# Patient Record
Sex: FEMALE | Race: ASIAN | Employment: FULL TIME | URBAN - METROPOLITAN AREA
[De-identification: names, ages, dates, MRNs, and addresses within clinical notes are randomized per-mention and may not be internally consistent; named-entity substitution may affect disease eponyms.]

---

## 2018-08-08 ENCOUNTER — TELEPHONE (OUTPATIENT)
Dept: PEDIATRICS | Facility: CLINIC | Age: 43
End: 2018-08-08

## 2018-08-08 NOTE — TELEPHONE ENCOUNTER
Reason for Call:  Other appointment    Detailed comments: Dr. Miles called to establish care and schedule a physical. Is requesting Dr. 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 Please advise if OK to establish care w/Dr. Thomas and call to advise or reschedule with another provider.      Phone Number Patient can be reached at: Cell number on file:    Telephone Information:   Mobile [phone]       Best Time: upon approval or decline of request    Can we leave a detailed message on this number? YES    Call taken on 8/8/2018 at 2:29 PM by Yanna Lozano

## 2018-08-08 NOTE — TELEPHONE ENCOUNTER
Spoke with patient and informed her of providers note below, pt verbalized understanding.  Appt scheduled with provider 09/17/18.

## 2019-04-23 ENCOUNTER — HOSPITAL ENCOUNTER (OUTPATIENT)
Dept: LAB | Facility: CLINIC | Age: 44
Discharge: HOME | End: 2019-04-23
Attending: INTERNAL MEDICINE | Admitting: INTERNAL MEDICINE
Payer: COMMERCIAL

## 2019-04-23 DIAGNOSIS — R10.9 FLANK PAIN: Primary | ICD-10-CM

## 2019-04-23 LAB
ALBUMIN UR-MCNC: NEGATIVE MG/DL
ANION GAP SERPL CALCULATED.3IONS-SCNC: 5 MMOL/L (ref 3–14)
APPEARANCE UR: CLEAR
BACTERIA #/AREA URNS HPF: ABNORMAL /HPF
BILIRUB UR QL STRIP: NEGATIVE
BUN SERPL-MCNC: 13 MG/DL (ref 7–30)
CALCIUM SERPL-MCNC: 8.9 MG/DL (ref 8.5–10.1)
CHLORIDE SERPL-SCNC: 106 MMOL/L (ref 94–109)
CO2 SERPL-SCNC: 29 MMOL/L (ref 20–32)
COLOR UR AUTO: ABNORMAL
CREAT SERPL-MCNC: 0.82 MG/DL (ref 0.52–1.04)
ERYTHROCYTE [DISTWIDTH] IN BLOOD BY AUTOMATED COUNT: 13.1 % (ref 10–15)
GFR SERPL CREATININE-BSD FRML MDRD: 86 ML/MIN/{1.73_M2}
GLUCOSE SERPL-MCNC: 100 MG/DL (ref 70–99)
GLUCOSE UR STRIP-MCNC: NEGATIVE MG/DL
HCT VFR BLD AUTO: 42.9 % (ref 35–47)
HGB BLD-MCNC: 14 G/DL (ref 11.7–15.7)
HGB UR QL STRIP: NEGATIVE
KETONES UR STRIP-MCNC: NEGATIVE MG/DL
LEUKOCYTE ESTERASE UR QL STRIP: NEGATIVE
MCH RBC QN AUTO: 27.7 PG (ref 26.5–33)
MCHC RBC AUTO-ENTMCNC: 32.6 G/DL (ref 31.5–36.5)
MCV RBC AUTO: 85 FL (ref 78–100)
MUCOUS THREADS #/AREA URNS LPF: PRESENT /LPF
NITRATE UR QL: NEGATIVE
PH UR STRIP: 5.5 PH (ref 5–7)
PLATELET # BLD AUTO: 292 10E9/L (ref 150–450)
POTASSIUM SERPL-SCNC: 3.6 MMOL/L (ref 3.4–5.3)
RBC # BLD AUTO: 5.05 10E12/L (ref 3.8–5.2)
RBC #/AREA URNS AUTO: 1 /HPF (ref 0–2)
SODIUM SERPL-SCNC: 140 MMOL/L (ref 133–144)
SOURCE: ABNORMAL
SP GR UR STRIP: 1.01 (ref 1–1.03)
SQUAMOUS #/AREA URNS AUTO: <1 /HPF (ref 0–1)
UROBILINOGEN UR STRIP-MCNC: NORMAL MG/DL (ref 0–2)
WBC # BLD AUTO: 8.3 10E9/L (ref 4–11)
WBC #/AREA URNS AUTO: 2 /HPF (ref 0–5)

## 2019-04-23 PROCEDURE — 85027 COMPLETE CBC AUTOMATED: CPT | Performed by: INTERNAL MEDICINE

## 2019-04-23 PROCEDURE — 80048 BASIC METABOLIC PNL TOTAL CA: CPT | Performed by: INTERNAL MEDICINE

## 2019-04-23 PROCEDURE — 81001 URINALYSIS AUTO W/SCOPE: CPT | Performed by: INTERNAL MEDICINE

## 2019-04-23 PROCEDURE — 36415 COLL VENOUS BLD VENIPUNCTURE: CPT | Performed by: INTERNAL MEDICINE

## 2020-06-13 ENCOUNTER — RESULTS ONLY (OUTPATIENT)
Dept: LAB | Age: 45
End: 2020-06-13

## 2020-06-13 ENCOUNTER — APPOINTMENT (OUTPATIENT)
Dept: URGENT CARE | Facility: URGENT CARE | Age: 45
End: 2020-06-13
Payer: COMMERCIAL

## 2020-06-14 LAB
SARS-COV-2 RNA SPEC QL NAA+PROBE: ABNORMAL
SPECIMEN SOURCE: ABNORMAL

## 2020-06-19 ENCOUNTER — HOSPITAL ENCOUNTER (EMERGENCY)
Facility: CLINIC | Age: 45
Discharge: HOME | End: 2020-06-19
Attending: EMERGENCY MEDICINE | Admitting: EMERGENCY MEDICINE
Payer: OTHER MISCELLANEOUS

## 2020-06-19 ENCOUNTER — APPOINTMENT (OUTPATIENT)
Dept: GENERAL RADIOLOGY | Facility: CLINIC | Age: 45
End: 2020-06-19
Attending: EMERGENCY MEDICINE
Payer: OTHER MISCELLANEOUS

## 2020-06-19 ENCOUNTER — APPOINTMENT (OUTPATIENT)
Dept: CT IMAGING | Facility: CLINIC | Age: 45
End: 2020-06-19
Attending: EMERGENCY MEDICINE
Payer: OTHER MISCELLANEOUS

## 2020-06-19 VITALS
SYSTOLIC BLOOD PRESSURE: 101 MMHG | DIASTOLIC BLOOD PRESSURE: 66 MMHG | HEART RATE: 74 BPM | OXYGEN SATURATION: 97 % | RESPIRATION RATE: 17 BRPM | TEMPERATURE: 100 F

## 2020-06-19 DIAGNOSIS — R05.9 COUGH: ICD-10-CM

## 2020-06-19 DIAGNOSIS — U07.1 2019 NOVEL CORONAVIRUS DISEASE (COVID-19): ICD-10-CM

## 2020-06-19 LAB
ANION GAP SERPL CALCULATED.3IONS-SCNC: 5 MMOL/L (ref 3–14)
BASOPHILS # BLD AUTO: 0 10E9/L (ref 0–0.2)
BASOPHILS NFR BLD AUTO: 0.3 %
BUN SERPL-MCNC: 12 MG/DL (ref 7–30)
CALCIUM SERPL-MCNC: 8.3 MG/DL (ref 8.5–10.1)
CHLORIDE SERPL-SCNC: 108 MMOL/L (ref 94–109)
CO2 SERPL-SCNC: 25 MMOL/L (ref 20–32)
CREAT SERPL-MCNC: 0.8 MG/DL (ref 0.52–1.04)
D DIMER PPP FEU-MCNC: 0.8 UG/ML FEU (ref 0–0.5)
DIFFERENTIAL METHOD BLD: ABNORMAL
EOSINOPHIL # BLD AUTO: 0 10E9/L (ref 0–0.7)
EOSINOPHIL NFR BLD AUTO: 0 %
ERYTHROCYTE [DISTWIDTH] IN BLOOD BY AUTOMATED COUNT: 13 % (ref 10–15)
GFR SERPL CREATININE-BSD FRML MDRD: 88 ML/MIN/{1.73_M2}
GLUCOSE SERPL-MCNC: 105 MG/DL (ref 70–99)
HCT VFR BLD AUTO: 39.9 % (ref 35–47)
HGB BLD-MCNC: 13.3 G/DL (ref 11.7–15.7)
IMM GRANULOCYTES # BLD: 0 10E9/L (ref 0–0.4)
IMM GRANULOCYTES NFR BLD: 0.3 %
INTERPRETATION ECG - MUSE: NORMAL
LYMPHOCYTES # BLD AUTO: 1.1 10E9/L (ref 0.8–5.3)
LYMPHOCYTES NFR BLD AUTO: 33.4 %
MCH RBC QN AUTO: 27.6 PG (ref 26.5–33)
MCHC RBC AUTO-ENTMCNC: 33.3 G/DL (ref 31.5–36.5)
MCV RBC AUTO: 83 FL (ref 78–100)
MONOCYTES # BLD AUTO: 0.2 10E9/L (ref 0–1.3)
MONOCYTES NFR BLD AUTO: 5.9 %
NEUTROPHILS # BLD AUTO: 1.9 10E9/L (ref 1.6–8.3)
NEUTROPHILS NFR BLD AUTO: 60.1 %
NRBC # BLD AUTO: 0 10*3/UL
NRBC BLD AUTO-RTO: 0 /100
PLATELET # BLD AUTO: 165 10E9/L (ref 150–450)
POTASSIUM SERPL-SCNC: 3.9 MMOL/L (ref 3.4–5.3)
RBC # BLD AUTO: 4.82 10E12/L (ref 3.8–5.2)
SODIUM SERPL-SCNC: 138 MMOL/L (ref 133–144)
WBC # BLD AUTO: 3.2 10E9/L (ref 4–11)

## 2020-06-19 PROCEDURE — 85025 COMPLETE CBC W/AUTO DIFF WBC: CPT | Performed by: EMERGENCY MEDICINE

## 2020-06-19 PROCEDURE — 25000132 ZZH RX MED GY IP 250 OP 250 PS 637: Performed by: EMERGENCY MEDICINE

## 2020-06-19 PROCEDURE — 25800030 ZZH RX IP 258 OP 636: Performed by: EMERGENCY MEDICINE

## 2020-06-19 PROCEDURE — 80048 BASIC METABOLIC PNL TOTAL CA: CPT | Performed by: EMERGENCY MEDICINE

## 2020-06-19 PROCEDURE — 25000125 ZZHC RX 250: Performed by: EMERGENCY MEDICINE

## 2020-06-19 PROCEDURE — 96374 THER/PROPH/DIAG INJ IV PUSH: CPT | Mod: 59

## 2020-06-19 PROCEDURE — 71045 X-RAY EXAM CHEST 1 VIEW: CPT

## 2020-06-19 PROCEDURE — 25000128 H RX IP 250 OP 636: Performed by: EMERGENCY MEDICINE

## 2020-06-19 PROCEDURE — 71275 CT ANGIOGRAPHY CHEST: CPT

## 2020-06-19 PROCEDURE — 99285 EMERGENCY DEPT VISIT HI MDM: CPT | Mod: 25

## 2020-06-19 PROCEDURE — 96361 HYDRATE IV INFUSION ADD-ON: CPT

## 2020-06-19 PROCEDURE — 93005 ELECTROCARDIOGRAM TRACING: CPT

## 2020-06-19 PROCEDURE — 94640 AIRWAY INHALATION TREATMENT: CPT

## 2020-06-19 PROCEDURE — 85379 FIBRIN DEGRADATION QUANT: CPT | Performed by: EMERGENCY MEDICINE

## 2020-06-19 RX ORDER — SODIUM CHLORIDE 9 MG/ML
1000 INJECTION, SOLUTION INTRAVENOUS CONTINUOUS
Status: DISCONTINUED | OUTPATIENT
Start: 2020-06-19 | End: 2020-06-19 | Stop reason: HOSPADM

## 2020-06-19 RX ORDER — ALBUTEROL SULFATE 90 UG/1
6 AEROSOL, METERED RESPIRATORY (INHALATION) ONCE
Status: COMPLETED | OUTPATIENT
Start: 2020-06-19 | End: 2020-06-19

## 2020-06-19 RX ORDER — ONDANSETRON 4 MG/1
4 TABLET, ORALLY DISINTEGRATING ORAL EVERY 8 HOURS PRN
Qty: 10 TABLET | Refills: 0 | Status: SHIPPED | OUTPATIENT
Start: 2020-06-19 | End: 2020-07-20

## 2020-06-19 RX ORDER — IOPAMIDOL 755 MG/ML
61 INJECTION, SOLUTION INTRAVASCULAR ONCE
Status: COMPLETED | OUTPATIENT
Start: 2020-06-19 | End: 2020-06-19

## 2020-06-19 RX ORDER — ONDANSETRON 2 MG/ML
4 INJECTION INTRAMUSCULAR; INTRAVENOUS EVERY 30 MIN PRN
Status: DISCONTINUED | OUTPATIENT
Start: 2020-06-19 | End: 2020-06-19 | Stop reason: HOSPADM

## 2020-06-19 RX ADMIN — IOPAMIDOL 61 ML: 755 INJECTION, SOLUTION INTRAVENOUS at 14:06

## 2020-06-19 RX ADMIN — ALBUTEROL SULFATE 6 PUFF: 90 AEROSOL, METERED RESPIRATORY (INHALATION) at 14:35

## 2020-06-19 RX ADMIN — SODIUM CHLORIDE 1000 ML: 9 INJECTION, SOLUTION INTRAVENOUS at 12:48

## 2020-06-19 RX ADMIN — ONDANSETRON 4 MG: 2 INJECTION INTRAMUSCULAR; INTRAVENOUS at 12:47

## 2020-06-19 RX ADMIN — SODIUM CHLORIDE 87 ML: 9 INJECTION, SOLUTION INTRAVENOUS at 14:07

## 2020-06-19 NOTE — ED PROVIDER NOTES
History     Chief Complaint:  Fever    HPI   Trinity Chow is a 44 year old female who presents to the emergency department for evaluation of fever. The patient reports that she has had a fever for the last 7 days. 6 days ago she had a COVID swab done and was informed 4 days ago that her test was positive. Since then her fevers have been fairly persistent and have ranged up to 101 or 102. Today her cough worsened and became more productive. Her fever also went up to 102 and the patient is concerned that she might develop a pneumonia. She also states that she has had some mild nausea and a poor appetite. The patient has been monitoring her pulse ox at home and states that it was as low as 92 or 93 and that her baseline is around 97. She denies any chest pain, shortness of breath, diarrhea or vomiting. She also denies alcohol, drug or tobacco use or any other medical history.     Allergies:  No Known Drug Allergies    Medications:    The patient is not currently taking any prescribed medications.    Past Medical History:    Hypogonadotropic hypogonadism    Past Surgical History:      Refractive surgery    Family History:    No past pertinent family history.    Social History:  The patient presents alone.  Smoking Status: Never  Smokeless Tobacco: Never  Alcohol Use: No  Drug Use: Not on file   Marital Status:        Review of Systems   All other systems reviewed and are negative.      Physical Exam   Vitals:  Patient Vitals for the past 24 hrs:   BP Temp Temp src Heart Rate Resp SpO2   20 1440 -- -- -- -- -- 99 %   20 1300 -- -- -- -- -- 97 %   20 1230 -- -- -- -- -- 96 %   20 1220 103/64 100  F (37.8  C) Oral 78 17 97 %        Physical Exam  General: Resting on the bed.  Appears comfortable, able to talk in full sentences   Head: No obvious trauma to head.  Ears, Nose, Throat:  External ears normal.  Nose normal.  No pharyngeal erythema, swelling or exudate.  Midline  uvula.    Eyes:  Conjunctivae clear.  Pupils are equal, round, and reactive.   Neck: Normal range of motion.  Neck supple.   CV: Regular rate and rhythm.  No murmurs.      Respiratory: Effort normal and breath sounds normal.  No wheezing.  Faint crackles throughout   Gastrointestinal: Soft.  No distension. There is no tenderness.    Musculoskeletal: Non tender non edematous calves  Neuro: Alert. Moving all extremities appropriately.  Normal speech.    Skin: Skin is warm and dry.  No rash noted.   Emergency Department Course   ECG:  Completed at 1303.  Read at 1305.   Rate 67 bpm. NH interval 148. QRS duration 78. QT/QTc 440/464. P-R-T axes 57 65 65.  Normal sinus rhythm  Possible left atrial enlargement    Imaging:  Radiographic findings were communicated with the patient who voiced understanding of the findings.    XR Chest Port 1 View  Negative chest.  Reading per radiology.    CT Chest Pulmonary Embolism W Contrast  IMPRESSION:   1.  No evidence of pulmonary embolism.   2. Commonly reported imaging features of (COVID-19) pneumonia are   present. Influenza pneumonia and organizing pneumonia as can be seen   in the setting of drug toxicity and connective tissue disease cancer   causing a similar imaging pattern.   Reading per radiology.    Laboratory:  CBC: WBC 3.2 (L) o/w WNL. (HGB 13.3, )   BMP: Glucose 105 (H), Calcium 8.3 (L) o/w  AWNL (Creatinine 0.80)    D Dimer (Collected 1252): 0.8 (H)     Interventions:  1247 Zofran 4 mg IV  1248 NS, 1 L, IV bolus  1435 Albuterol inhaler 6 puff Inhalation    Emergency Department Course:  Past medical records, nursing notes, and vitals reviewed.    1230 I performed an exam of the patient as documented above.     EKG obtained in the ED, see results above.   IV was inserted and blood was drawn for laboratory testing, results above.  The patient was sent for a chest XR and chest CT while in the emergency department, results above.     1449 I rechecked the patient and  discussed the results of her workup thus far. Discussed plan of care and patient will be discharged.    Findings and plan explained to the Patient. Patient discharged home with instructions regarding supportive care, medications, and reasons to return. The importance of close follow-up was reviewed. The patient was prescribed Zofran.    I personally reviewed the laboratory results with the Patient and answered all related questions prior to discharge.    Impression & Plan      Medical Decision Makin-year-old female otherwise healthy presents with fever and cough.  Vital signs are reassuring.  Broad differentials pursued include not limited to pneumonia, pneumonitis, PE, dissection, severe sepsis, septic shock, arrhythmia, dehydration, COVID-19, etc.  Patient already has a prior COVID-19 swab done and positive.  She presents with worsening fever and cough.  CBC shows mild leukopenia but no evidence of anemia.  BMP shows no acute electrolyte, metabolic, renal dysfunction.  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  D-dimer was elevated 0.8.  Patient had no chest pain or shortness of breath.  Chest x-ray shows no evidence acute pneumonia, pneumothorax, or effusion.  EKG shows sinus rhythm, no evidence acute ST-T wave change.  No evidence of arrhythmia.  Dimer was elevated therefore CT PE was obtained.  CT PE shows groundglass opacities commonly reported with COVID-19.  No suggestion of superimposed pneumonia can be seen on CT scan.  With prior COVID-19 swab done and positive per CT seems most consistent with COVID-19 infection.  We reviewed imaging and labs together.  She was given albuterol inhaler with some improvement in work of breathing.  She is not tachypneic, not hypoxic, not having retraction or other acute indications of admission.  She was ambulated in her room without desaturations.  She was given Zofran and fluids with some improvement in her symptoms  as well.  She was prescribed Zofran for home.  At this point she seems reasonably safe for discharge.  She will continue to monitor her symptoms closely.  Close follow-up was advised.  Patient was discharged home.    Covid-19  Trinity Chow was evaluated during a global COVID-19 pandemic, which necessitated consideration that the patient might be at risk for infection with the SARS-CoV-2 virus that causes COVID-19.   Applicable protocols for evaluation were followed during the patient's care.   COVID-19 was considered as part of the patient's evaluation. The plan for testing is:  the patient was referred for outpatient testing.     Diagnosis:    ICD-10-CM    1. 2019 novel coronavirus disease (COVID-19)  U07.1    2. Cough  R05        Disposition:  discharged to home    Discharge Medications:  New Prescriptions    ONDANSETRON (ZOFRAN ODT) 4 MG ODT TAB    Take 1 tablet (4 mg) by mouth every 8 hours as needed for nausea     IRonald, am serving as a scribe on 6/19/2020 at 12:24 PM to personally document services performed by Mary Copeland MD based on my observations and the provider's statements to me.     Scribe Disclosure:  I, Tasha Ortiz, am serving as a scribe at 2:10 PM on 6/19/2020 to document services personally performed by Mary Copeland MD based on my observations and the provider's statements to me.       Ronald Lane  6/19/2020    EMERGENCY DEPARTMENT       Mary Copeland MD  06/19/20 [id number]       Mary Copeland MD  06/19/20 [id number]

## 2020-06-19 NOTE — ED AVS SNAPSHOT
Emergency Department  [address] [facility] [address]  Phone:  [phone]  Fax:  [phone]                                    Trinity Chow   MRN: [id number]    Department:   Emergency Department   Date of Visit:  6/19/2020           After Visit Summary Signature Page    I have received my discharge instructions, and my questions have been answered. I have discussed any challenges I see with this plan with the nurse or doctor.    ..........................................................................................................................................  Patient/Patient Representative Signature      ..........................................................................................................................................  Patient Representative Print Name and Relationship to Patient    ..................................................               ................................................  Date                                   Time    ..........................................................................................................................................  Reviewed by Signature/Title    ...................................................              ..............................................  Date                                               Time          22EPIC Rev 08/18

## 2020-06-19 NOTE — ED TRIAGE NOTES
Pt tested postive for covid on 6/13. Since has has a fever of 101.5 today. Took advil at 11. NS O2 dropped tp 93% on RA.

## 2020-06-19 NOTE — DISCHARGE INSTRUCTIONS
Please return to the ED if you have worsening cough, fevers >101, chest pain, shortness of breath, intractable vomiting, or other acute changes.  Please follow up with your PCP in the next 2-3 days.      Use tylenol and ibuprofen for pain.  Drink plenty of fluids and rest.      Discharge Instructions  COVID-19    Your Provider has determined that you should practice self-isolation and self-monitoring in order to protect yourself and your community from COVID-19, which is the disease caused by a new coronavirus. The virus spreads from person to person primarily by droplets when an infected person coughs or sneezes and the droplet either lands on another person or that other person touches a surface with the droplet on it. Diagnosis of COVID-19 can be made with a test but many times the test is unavailable or not necessary. There is no specific treatment or medicine for the disease.    Symptoms of COVID-19  Many people have no symptoms or mild symptoms.  Symptoms may usually appear 4 to 5 days (up to 14 days) after contact with another ill person. Some people will get severe symptoms and pneumonia. Usual symptoms are:     Fever    Cough   Trouble breathing   Less common symptoms are: Headache, body aches, sore    throat, sneezing, diarrhea.    How to Care for Yourself    Stay home.  Most people will recover from illness with mild symptoms.  Isolation by staying home is the best method to prevent the spread of the illness. Do not go to work or school. Have a friend or relative do your shopping. Do not use public transportation (bus, train) or ridesharing (Lyft, Uber).    How long should I stay home?  If you have symptoms of a respiratory disease (fever, cough), you should stay home for at least 7 days, and for 3 days with no fever and improvement of respiratory symptoms--whichever is longer. (Your fever should be gone for 3 days without using fever-reducing medicine.)    For example, if you have a fever and coughing  for 4 days, you need to stay home 3 more days with no fever for a total of 7 days. Or, if you have a fever and coughing for 5 days, you need to stay home 3 more days with no fever for a total of 8 days.    Separate yourself from other people in your home.?As much as possible, you should stay in one room and away from other people in your home. Also, use a separate bathroom, if possible. Avoid handling pets or other animals while sick.     Wear a facemask if you need to be around other people and cover your mouth and nose with a tissue when you cough or sneeze.     Avoid sharing personal household items. You should not share dishes, drinking glasses, forks/knives/spoons, towels, or bedding with other people in your home. After using these items, they should be washed with soap and water. Clean parts of your home that are touched often (doorknobs, faucets, countertops, etc.) daily.     Wash your hands often with soap and water for at least 20 seconds or use an alcohol-based hand  containing at least 60% alcohol.     Avoid touching your face.    Treat your symptoms: Take Acetaminophen (Tylenol) to treat body aches and fever as needed for comfort. Ibuprofen (Advil or Motrin) can be used as well if you still have symptoms after taking Tylenol.  Drink fluids. Rest.    Watch for worsening symptoms, shortness of breath, or difficulty   Breathing.    Employers/workplaces are being asked by the Centers for Disease Control (CDC) to not request notes/documentation for you to return to work or prove that you were ill. You may choose to show your employer this paperwork.    Return to the Emergency Department if:    If you are developing worsening breathing, shortness of breath, or feel worse you should seek medical attention.  If you are uncertain, contact your health care provider/clinic. If you need emergency medical attention, call 911 and tell them you have been ill.    Discharge Instructions  Upper Respiratory  Infection    The upper respiratory tract includes the sinuses, nasal passages, pharynx, and larynx. A URI, or upper respiratory infection, is an infection of any of the parts of the upper airway. Symptoms include runny nose, congestion, sneezing, sore throat, cough, and fever. URIs are almost always caused by a virus. Antibiotics do not help with viral infections, so are generally not prescribed. A URI is very contagious through coughing and nasal secretions; make sure you wash your hands often and clean surfaces after sneezing, coughing or touching them. While you should start to improve in 3 - 5 days, remember that sometimes a cough can linger for several weeks.    Generally, every Emergency Department visit should have a follow-up clinic visit with either a primary or a specialty clinic/provider. Please follow-up as instructed by your emergency provider today.    Return to the Emergency Department if:  Any of your symptoms get much worse.  You seem very sick, like being too weak to get up.  You have chest pain or shortness of breath.   You have a severe headache.  You are vomiting (throwing up) so much you cannot keep fluids or medicines down.  You have confusion or seem unusually drowsy.  You have a seizure.    What can I do to help myself?  Fill any prescriptions the provider gave you and take them right away  If you have a fever, get plenty of rest and drink lots of fluids, especially water.  Using a humidifier or saline nose spray will also help loosen mucous.   Clothes or blankets will not change your fever. Do what is comfortable for you.  Bathing or sponging in lukewarm water may help you feel better.  Acetaminophen (Tylenol ) or ibuprofen (Advil , Motrin ) will help bring fever down and may help you feel more comfortable. Be sure to read and follow the package directions, and ask your provider if you have questions.  Do not drink alcohol.  Decongestants may help you feel better. You may use decongestant  nose sprays Afrin  (oxymetazoline) or August-Synephrine  (phenylephrine hydrochloride) for up to 3 days, or may use a decongestant tablet like Sudafed  (pseudoephedrine).  If you were given a prescription for medicine here today, be sure to read all of the information (including the package insert) that comes with your prescription.  This will include important information about the medicine, its side effects, and any warnings that you need to know about.  The pharmacist who fills the prescription can provide more information and answer questions you may have about the medicine.  If you have questions or concerns that the pharmacist cannot address, please call or return to the Emergency Department.   Remember that you can always come back to the Emergency Department if you are not able to see your regular provider in the amount of time listed above, if you get any new symptoms, or if there is anything that worries you.

## 2020-06-21 ENCOUNTER — HOSPITAL ENCOUNTER (EMERGENCY)
Facility: CLINIC | Age: 45
Discharge: ANOTHER HOSPITAL | End: 2020-06-21
Attending: EMERGENCY MEDICINE | Admitting: EMERGENCY MEDICINE
Payer: OTHER MISCELLANEOUS

## 2020-06-21 ENCOUNTER — APPOINTMENT (OUTPATIENT)
Dept: GENERAL RADIOLOGY | Facility: CLINIC | Age: 45
End: 2020-06-21
Attending: EMERGENCY MEDICINE
Payer: OTHER MISCELLANEOUS

## 2020-06-21 VITALS
WEIGHT: 155 LBS | TEMPERATURE: 100.5 F | DIASTOLIC BLOOD PRESSURE: 74 MMHG | RESPIRATION RATE: 18 BRPM | SYSTOLIC BLOOD PRESSURE: 110 MMHG | OXYGEN SATURATION: 99 % | HEART RATE: 61 BPM

## 2020-06-21 DIAGNOSIS — R06.03 ACUTE RESPIRATORY DISTRESS: ICD-10-CM

## 2020-06-21 DIAGNOSIS — U07.1 2019 NOVEL CORONAVIRUS DISEASE (COVID-19): ICD-10-CM

## 2020-06-21 DIAGNOSIS — M62.81 GENERALIZED MUSCLE WEAKNESS: ICD-10-CM

## 2020-06-21 LAB
ALBUMIN SERPL-MCNC: 3.1 G/DL (ref 3.4–5)
ALP SERPL-CCNC: 52 U/L (ref 40–150)
ALT SERPL W P-5'-P-CCNC: 28 U/L (ref 0–50)
ANION GAP SERPL CALCULATED.3IONS-SCNC: 6 MMOL/L (ref 3–14)
AST SERPL W P-5'-P-CCNC: 42 U/L (ref 0–45)
B-HCG FREE SERPL-ACNC: <5 IU/L
BASOPHILS # BLD AUTO: 0 10E9/L (ref 0–0.2)
BASOPHILS NFR BLD AUTO: 0.2 %
BILIRUB SERPL-MCNC: 0.3 MG/DL (ref 0.2–1.3)
BUN SERPL-MCNC: 11 MG/DL (ref 7–30)
CALCIUM SERPL-MCNC: 8.5 MG/DL (ref 8.5–10.1)
CHLORIDE SERPL-SCNC: 104 MMOL/L (ref 94–109)
CO2 SERPL-SCNC: 24 MMOL/L (ref 20–32)
CREAT SERPL-MCNC: 0.68 MG/DL (ref 0.52–1.04)
D DIMER PPP FEU-MCNC: 1.4 UG/ML FEU (ref 0–0.5)
DIFFERENTIAL METHOD BLD: NORMAL
EOSINOPHIL # BLD AUTO: 0 10E9/L (ref 0–0.7)
EOSINOPHIL NFR BLD AUTO: 0 %
ERYTHROCYTE [DISTWIDTH] IN BLOOD BY AUTOMATED COUNT: 13.1 % (ref 10–15)
GFR SERPL CREATININE-BSD FRML MDRD: >90 ML/MIN/{1.73_M2}
GLUCOSE SERPL-MCNC: 104 MG/DL (ref 70–99)
HCT VFR BLD AUTO: 38.6 % (ref 35–47)
HGB BLD-MCNC: 13.1 G/DL (ref 11.7–15.7)
IMM GRANULOCYTES # BLD: 0 10E9/L (ref 0–0.4)
IMM GRANULOCYTES NFR BLD: 0.2 %
INTERPRETATION ECG - MUSE: NORMAL
LYMPHOCYTES # BLD AUTO: 1.3 10E9/L (ref 0.8–5.3)
LYMPHOCYTES NFR BLD AUTO: 25.2 %
MCH RBC QN AUTO: 28.1 PG (ref 26.5–33)
MCHC RBC AUTO-ENTMCNC: 33.9 G/DL (ref 31.5–36.5)
MCV RBC AUTO: 83 FL (ref 78–100)
MONOCYTES # BLD AUTO: 0.3 10E9/L (ref 0–1.3)
MONOCYTES NFR BLD AUTO: 4.9 %
NEUTROPHILS # BLD AUTO: 3.6 10E9/L (ref 1.6–8.3)
NEUTROPHILS NFR BLD AUTO: 69.5 %
NRBC # BLD AUTO: 0 10*3/UL
NRBC BLD AUTO-RTO: 0 /100
PLATELET # BLD AUTO: 201 10E9/L (ref 150–450)
POTASSIUM SERPL-SCNC: 3.8 MMOL/L (ref 3.4–5.3)
PROT SERPL-MCNC: 7.6 G/DL (ref 6.8–8.8)
RBC # BLD AUTO: 4.66 10E12/L (ref 3.8–5.2)
SODIUM SERPL-SCNC: 134 MMOL/L (ref 133–144)
WBC # BLD AUTO: 5.1 10E9/L (ref 4–11)

## 2020-06-21 PROCEDURE — 99285 EMERGENCY DEPT VISIT HI MDM: CPT | Mod: 25

## 2020-06-21 PROCEDURE — 25800030 ZZH RX IP 258 OP 636: Performed by: EMERGENCY MEDICINE

## 2020-06-21 PROCEDURE — 85025 COMPLETE CBC W/AUTO DIFF WBC: CPT | Performed by: EMERGENCY MEDICINE

## 2020-06-21 PROCEDURE — 84702 CHORIONIC GONADOTROPIN TEST: CPT

## 2020-06-21 PROCEDURE — 71045 X-RAY EXAM CHEST 1 VIEW: CPT

## 2020-06-21 PROCEDURE — 85379 FIBRIN DEGRADATION QUANT: CPT | Performed by: EMERGENCY MEDICINE

## 2020-06-21 PROCEDURE — 96360 HYDRATION IV INFUSION INIT: CPT

## 2020-06-21 PROCEDURE — 80053 COMPREHEN METABOLIC PANEL: CPT | Performed by: EMERGENCY MEDICINE

## 2020-06-21 PROCEDURE — 25000132 ZZH RX MED GY IP 250 OP 250 PS 637: Performed by: EMERGENCY MEDICINE

## 2020-06-21 PROCEDURE — 93005 ELECTROCARDIOGRAM TRACING: CPT

## 2020-06-21 RX ORDER — ACETAMINOPHEN 500 MG
500 TABLET ORAL ONCE
Status: COMPLETED | OUTPATIENT
Start: 2020-06-21 | End: 2020-06-21

## 2020-06-21 RX ADMIN — ACETAMINOPHEN 500 MG: 500 TABLET, FILM COATED ORAL at 13:04

## 2020-06-21 RX ADMIN — SODIUM CHLORIDE 1000 ML: 9 INJECTION, SOLUTION INTRAVENOUS at 12:55

## 2020-06-21 RX ADMIN — SODIUM CHLORIDE 1000 ML: 9 INJECTION, SOLUTION INTRAVENOUS at 10:30

## 2020-06-21 ASSESSMENT — ENCOUNTER SYMPTOMS
FATIGUE: 1
COUGH: 1
VOMITING: 0
SHORTNESS OF BREATH: 1
DIARRHEA: 0
FEVER: 1
ABDOMINAL PAIN: 0
NAUSEA: 0
MYALGIAS: 0
SORE THROAT: 0

## 2020-06-21 NOTE — ED PROVIDER NOTES
History     Chief Complaint:  Shortness of Breath       HPI   Trinity Chow is a 44 year old female, COVID positive, who presents with shortness of breath. Patient was seen in the emergency department 6/19 with concerns for fever following positive COVID testing from 6/13 (see below). D-dimer was elevated, but CT PE  showed bilateral groundglass opacities, distant with COVID-19 infection, but no PE. Patient improved with albuterol inhaler in the emergency department, as well as Zofran and IVF. Patient was prescribed Zofran for home and discharged.  Since then, she is continued to have intermittent fever, with last temperature measured 2 days ago at 101 Fahrenheit.  She has been following her oxygen saturation with a portable sat probe.  After being discharged from the ED, her sats were 94 to 95%.  Over the last 24 hours, they have dipped as low as 89%.  Along with this, she has had increased shortness of breath, and increased cough.  While in the restroom last night, she became acutely more short of breath, and nearly passed out.  She had tunnel vision, sank down to the floor, landing on her buttocks, but does not think she hit her head.  She has been feeling extremely fatigued.  She denies any vomiting, diarrhea, abdominal pain, myalgias, or sore throat.  She has been trying to stay hydrated.  She has been taking Tylenol as needed for fever.  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    [facility] ED 6/19/2002  RHONDA Copeland MD     ECG:  Completed at 1303.  Read at 1305.   Rate 67 bpm. AK interval 148. QRS duration 78. QT/QTc 440/464. P-R-T axes 57 65 65.  Normal sinus rhythm  Possible left atrial enlargement     Imaging:  Radiographic findings were communicated with the patient who voiced understanding of the findings.     XR Chest Port 1 View  Negative chest.  Reading per radiology.     CT Chest Pulmonary Embolism W Contrast  IMPRESSION:   1.  No evidence of pulmonary embolism.   2. Commonly reported imaging  features of (COVID-19) pneumonia are   present. Influenza pneumonia and organizing pneumonia as can be seen   in the setting of drug toxicity and connective tissue disease cancer   causing a similar imaging pattern.   Reading per radiology.     Laboratory:  CBC: WBC 3.2 (L) o/w WNL. (HGB 13.3, )   BMP: Glucose 105 (H), Calcium 8.3 (L) o/w  AWNL (Creatinine 0.80)     D Dimer (Collected 1252): 0.8 (H)      Allergies:  No Known Drug Allergies     Medications:    The patient is currently on no regular medications.     Past Medical History:    Hypogonadotropic hypogonadism     Past Surgical History:    History reviewed. No pertinent past surgical history.     Family History:    History reviewed. No pertinent family history.      Social History:  The patient was alone.  Works as a hospitalist at [employer]  Smoking Status: Never  Smokeless Tobacco: Never  Alcohol Use: No   Marital Status:        Review of Systems   Constitutional: Positive for fatigue and fever.   HENT: Negative for sore throat.    Eyes: Positive for visual disturbance (tunnel vision 2/2 ).   Respiratory: Positive for cough and shortness of breath.    Gastrointestinal: Negative for abdominal pain, diarrhea, nausea and vomiting.   Musculoskeletal: Negative for myalgias.   Neurological: Negative for syncope.   All other systems reviewed and are negative.      Physical Exam     Patient Vitals for the past 24 hrs:   BP Temp Temp src Heart Rate Resp SpO2   06/21/20 1000 91/71 100.8  F (38.2  C) Oral 80 18 99 %       Physical Exam  General: Lying on gurney, wearing a mask, short of breath  HENT: mucous membranes moist  CV: regular rate, regular rhythm  Resp: Dyspneic when relating history, bilateral crackles throughout.  Slightly tachypneic.  GI: abdomen soft and nontender, no guarding  MSK: no bony tenderness  Extremities: Calves nontender.  Skin: appropriately warm and dry  Neuro: alert, clear speech, oriented  Psych: normal mood and  affect      Emergency Department Course     ECG:  Indication: Shortness of breath   Completed at 0956.  Read at 1000.   Sinus rhythm   Normal ECG   Rate 73 bpm. WA interval 146. QRS duration 84. QT/QTc 410/451. P-R-T axes 45 63 58.    Imaging:  Radiology findings were communicated with the patient and Accepting MD who voiced understanding of the findings.    XR Chest Port 1 View  IMPRESSION: Single portable AP view of the chest was obtained.  Cardiomediastinal silhouette is within normal limits. Postsurgical  changes of bilateral breast implants. Bibasilar, right worse than  left, patchy pulmonary opacities, slightly worsened as compared to  6/19/2020 chest x-ray and consistent with typical imaging findings of  the COVID-19 infection. No significant pleural effusion or  pneumothorax.  Report per radiology     Laboratory:  Laboratory findings were communicated with the patient and Accepting MD who voiced understanding of the findings.    CBC: AWNL. (WBC 5.1, HGB 13.1, )   CMP: Glucose 104 (H), Albumin 3.1 (L) o/w WNL (Creatinine 0.68)     D-Dimer: 1.4 (H)   ISTAT HCG Qualitative Pregnancy POCT: <5.0     Interventions:  NS Bolus 1,000mL IV     Emergency Department Course:  Past medical records, nursing notes, and vitals reviewed.    0930 I performed an exam of the patient as documented above.     IV was inserted and blood was drawn for laboratory testing, results above.  The patient was sent for a CXR while in the emergency department, results above.     1002 During road test, the patient sat was 94%, however she was extremely weak and had difficulty walking because of fatigue and weakness.  Per staff who saw the patient here 3 days ago, she appears much more ill.    I rechecked the patient and discussed the results of her workup thus far.     Findings and plan explained to the Patient. Patient will be transferred to [facility] via EMS. Discussed the case with Dr. Slater, who will admit the patient to  a monitored bed for further monitoring, evaluation, and treatment.    I personally reviewed the laboratory and imaging results with the Patient and answered all related questions prior to transfer.     Impression & Plan     Covid-19  Trinity Chow was evaluated during a global COVID-19 pandemic and was seen in our emergency department which necessitated consideration that the patient might be at risk for infection with the SARS-CoV-2 virus that causes COVID-19.   Applicable protocols for evaluation were followed during the patient's care.   COVID-19 was considered as part of the patient's evaluation. The plan for testing is:  a test was obtained at a previous visit and reviewed & considered today    Medical Decision Making:  Trinity Chow is a 44-year-old female otherwise healthy who presents with increased shortness of breath.  She has history of positive COVID testing recently for similar symptoms.  In the interim, she has become more weak, fatigued, with increased dyspnea.  Vital signs demonstrate normal oxygen saturation however low normal blood pressure and fever.  Chest x-ray shows interval worsening of bilateral infiltrates, consistent with worsening symptoms.  While ambulating in the ED, she was extremely weak and unable to bear her weight very well.  She did not desaturate, however had increased work of breathing and severe shortness of breath.  Labs are notable for interval increase in d-dimer from 0.8-1.4, which is associated with more severe disease.  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    Diagnosis:    ICD-10-CM    1. 2019 novel coronavirus disease (COVID-19)  U07.1    2. Acute respiratory distress  R06.03    3. Generalized muscle weakness  M62.81        Disposition:  Transferred to [facility].    Scribe Disclosure:  Gloria HAHN, am serving as a scribe at 9:28 AM on 6/21/2020 to document services personally  performed by Nerissa Thurston MD based on my observations and the provider's statements to me.   6/21/2020    EMERGENCY DEPARTMENT       Nerissa Thurston MD  06/21/20 1158

## 2020-06-22 ENCOUNTER — DOCUMENTATION ONLY (OUTPATIENT)
Dept: OTHER | Facility: CLINIC | Age: 45
End: 2020-06-22

## 2020-06-29 ENCOUNTER — AMBULATORY - HEALTHEAST (OUTPATIENT)
Dept: CARE COORDINATION | Facility: CLINIC | Age: 45
End: 2020-06-29

## 2020-06-29 ENCOUNTER — COMMUNICATION - HEALTHEAST (OUTPATIENT)
Dept: CARE COORDINATION | Facility: CLINIC | Age: 45
End: 2020-06-29

## 2020-06-29 DIAGNOSIS — U07.1 2019 NOVEL CORONAVIRUS DISEASE (COVID-19): ICD-10-CM

## 2020-07-01 ENCOUNTER — MYC MEDICAL ADVICE (OUTPATIENT)
Dept: FAMILY MEDICINE | Facility: CLINIC | Age: 45
End: 2020-07-01

## 2020-07-01 ENCOUNTER — VIRTUAL VISIT (OUTPATIENT)
Dept: FAMILY MEDICINE | Facility: CLINIC | Age: 45
End: 2020-07-01
Payer: OTHER MISCELLANEOUS

## 2020-07-01 DIAGNOSIS — U07.1 INFECTION DUE TO 2019 NOVEL CORONAVIRUS: Primary | ICD-10-CM

## 2020-07-01 DIAGNOSIS — R05.9 COUGH: ICD-10-CM

## 2020-07-01 DIAGNOSIS — R09.02 HYPOXEMIA: ICD-10-CM

## 2020-07-01 DIAGNOSIS — Z09 HOSPITAL DISCHARGE FOLLOW-UP: ICD-10-CM

## 2020-07-01 DIAGNOSIS — R79.82 ELEVATED C-REACTIVE PROTEIN (CRP): ICD-10-CM

## 2020-07-01 DIAGNOSIS — R06.09 DYSPNEA ON EXERTION: ICD-10-CM

## 2020-07-01 DIAGNOSIS — R79.89 ELEVATED D-DIMER: ICD-10-CM

## 2020-07-01 DIAGNOSIS — Z79.01: ICD-10-CM

## 2020-07-01 PROCEDURE — 99204 OFFICE O/P NEW MOD 45 MIN: CPT | Mod: 95 | Performed by: INTERNAL MEDICINE

## 2020-07-01 NOTE — PROGRESS NOTES
"Trinity Chow is a 44 year old female who is being evaluated via a billable video visit.      The patient has been notified of following:     \"This video visit will be conducted via a call between you and your physician/provider. We have found that certain health care needs can be provided without the need for an in-person physical exam.  This service lets us provide the care you need with a video conversation.  If a prescription is necessary we can send it directly to your pharmacy.  If lab work is needed we can place an order for that and you can then stop by our lab to have the test done at a later time.    Video visits are billed at different rates depending on your insurance coverage.  Please reach out to your insurance provider with any questions.    If during the course of the call the physician/provider feels a video visit is not appropriate, you will not be charged for this service.\"    Patient has given verbal consent for Video visit? Yes  How would you like to obtain your AVS? [place]on  Patient would like the video invitation sent by: DONAVON, if needed: [phone]  Will anyone else be joining your video visit? No  Subjective     Trinity Chow is a 44 year old female who presents today via video visit for the following health issues:    HPI    Hospital Follow-up Visit:    Hospital/Nursing Home/IP Rehab Facility: [facility]  Date of Admission: 6-  Date of Discharge: 6-  Reason(s) for Admission: Positive COVID.  SOB, Weakness      Was your hospitalization related to COVID-19? YES   How are you feeling today? Better  In the past 24 hours have you had shortness of breath when speaking, walking, or climbing stairs? My breathing issues have improved  Do you have a cough? Yes, I have a cough but it's not worse  When is the last time you had a fever greater than 100?  Are you having any other symptoms? Shortness of breath/dyspnea on exertion   Do you have any other stressors " you would like to discuss with your provider? No      Was the patient in the ICU or did the patient experience delirium during hospitalization?  Yes     Problems taking medications regularly:  None  Medication changes since discharge: None  Problems adhering to non-medication therapy:  None    Summary of hospitalization:  [facility] discharge summary reviewed  Diagnostic Tests/Treatments reviewed.  Follow up needed: Yes  Other Healthcare Providers Involved in Patient s Care:         Specialist appointment - Pulmonary specialty  Update since discharge: improved.   Post Discharge Medication Reconciliation: discharge medications reconciled, continue medications without change.  Plan of care communicated with patient          Patient presented for hospital follow-up, on the 16th of this month had high-grade fever with sore throat, she tested positive for COVID 19, she remained febrile for 9 days, initially was not short of breath was feeling winded and was dyspneic but not short of breath.  She had evaluation initially with CT chest that ruled out PE, she had positive pneumonia.  Patient went to the ER initially, she was discharged home initially, she had a syncopal episode 2 days later and went back to the ER, she was not hypoxic but was dyspneic, she was admitted to [facility] for 5 days in the hospital and she had deterioration including hypoxemia and increased interleukin level suggestive of impending lung injury, she received Remdesivir with the placebo or interleukin-6 inhibitor, as well steroids Decadron per protocol for COVID 19 treatment.  She reports she was not truly hypoxic but she was maintained on oxygen, she went back home on Friday, states she is feeling better, her oxygen goes down to 88 to 89% on exertion on room air, she recovers quickly and oxygen picks up to 93% on room air.  She still feels winded when climbing stairs.  Procalcitonin level was negative so it was unlikely pneumonia,  her D-dimers was 1.4 decreased to 0.6 on discharge, the fibrinogen CRP were elevated.  She received Decadron 6 mg IV for 5 days.  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    Video Start Time: 10:57 AM      There is no problem list on file for this patient.    History reviewed. No pertinent surgical history.    Social History     Tobacco Use     Smoking status: Never Smoker     Smokeless tobacco: Never Used   Substance Use Topics     Alcohol use: Not Currently     History reviewed. No pertinent family history.      Current Outpatient Medications   Medication Sig Dispense Refill     apixaban ANTICOAGULANT (ELIQUIS ANTICOAGULANT) 2.5 MG tablet Take 1 tablet (2.5 mg) by mouth 2 times daily 60 tablet 0     No Known Allergies  Recent Labs   Lab Test 06/21/20  1014 06/19/20  1252   ALT 28  --    CR 0.68 0.80   GFRESTIMATED >90 88   GFRESTBLACK >90 >90   POTASSIUM 3.8 3.9      BP Readings from Last 3 Encounters:   06/21/20 110/74   06/19/20 101/66    Wt Readings from Last 3 Encounters:   06/21/20 70.3 kg (155 lb)                    Reviewed and updated as needed this visit by Provider  Tobacco  Allergies  Meds  Problems  Med Hx  Surg Hx  Fam Hx  Soc Hx          Review of Systems   Constitutional, HEENT, cardiovascular, pulmonary, GI, , musculoskeletal, neuro, skin, endocrine and psych systems are negative, except as otherwise noted.      Objective             Physical Exam     GENERAL: Healthy, alert and no distress  EYES: Eyes grossly normal to inspection.  No discharge or erythema, or obvious scleral/conjunctival abnormalities.  RESP: No audible wheeze, cough, or visible cyanosis.  No visible retractions or increased work of breathing.    RESP has occasional cough  MS: No gross musculoskeletal defects noted.  Normal range of motion.  No visible edema.  SKIN: Visible skin clear. No  significant rash, abnormal pigmentation or lesions.  NEURO: Cranial nerves grossly intact.  Mentation and speech appropriate for age.    PSYCH: Mentation appears normal, affect normal/bright, judgement and insight intact, normal speech and appearance well-groomed.      Diagnostic Test Results:  Labs reviewed in Epic        Trinity was seen today for hospital f/u.    Diagnoses and all orders for this visit:    Infection due to 2019 novel coronavirus  -     apixaban ANTICOAGULANT (ELIQUIS ANTICOAGULANT) 2.5 MG tablet; Take 1 tablet (2.5 mg) by mouth 2 times daily    Hospital discharge follow-up    Prophylactic use of oral factor Xa inhibitor for venous thromboembolism  -     apixaban ANTICOAGULANT (ELIQUIS ANTICOAGULANT) 2.5 MG tablet; Take 1 tablet (2.5 mg) by mouth 2 times daily    Hypoxemia    Dyspnea on exertion    Cough    Elevated d-dimer    Elevated C-reactive protein (CRP)      Discussed with patient about VTE prophylaxis, she is low risk, patient states she wants to discuss first with a friend friend of hers who is a physician critical care medicine in [facility].  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  Down the road she should see pulmonary specialist and do pulmonary function test.  Addendum patient states that she has contacted her friend who is a physician in critical care medicine and who advised her to start on low-dose Eliquis.  We sent a prescription for 2.5 mg twice daily.  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  We will treat at least for 4 weeks with low dose Eliquis 2.5 mg twice daily.  She has low risk for bleeding, denies any history of GI bleed or  history of bleeding diathesis.  There was no documentation of prior blood clots during hospitalization.  Patient remained dyspneic, will closely watch her as outpatient    Video-Visit Details    Type of service:  Video Visit    Video End Time:11:26 AM    Originating Location (pt. Location): Home    Distant Location (provider location):  [facility]     Platform used for Video Visit: AmWell    Return in about 2 weeks (around 7/15/2020), or if symptoms worsen or fail to improve.       Carmen Kemp MD

## 2020-07-01 NOTE — TELEPHONE ENCOUNTER
Please see My Chart message below and advise as appropriate.  Patient had a virtual appointment with provider at 1100 today?  KIRSTIN Alberto, RN  [employer] [facility]

## 2020-07-01 NOTE — TELEPHONE ENCOUNTER
Discussed with patient guidelines for VTE prophylaxis for patients with history of COVID infection post hospital discharge.  Will start patient on Eliquis 2.5 mg twice daily will dispense #60.

## 2020-07-02 NOTE — TELEPHONE ENCOUNTER
This provider discussed with patient recommendations by medical therapy management [MTM] (Dr Nadira Fu)  whom I consulted with and MTM advised that patient is low risk per the screening tools for VTE prophylaxis and patient is not candidate for anticoagulation post COVID 19.  Patient stated she would still want to proceed with the low-dose anticoagulation, and she had already started on Eliquis 2.5 mg twice daily dose.

## 2020-07-08 ENCOUNTER — COMMUNICATION - HEALTHEAST (OUTPATIENT)
Dept: FAMILY MEDICINE | Facility: CLINIC | Age: 45
End: 2020-07-08

## 2020-07-15 ENCOUNTER — COMMUNICATION - HEALTHEAST (OUTPATIENT)
Dept: FAMILY MEDICINE | Facility: CLINIC | Age: 45
End: 2020-07-15

## 2020-07-20 ENCOUNTER — VIRTUAL VISIT (OUTPATIENT)
Dept: FAMILY MEDICINE | Facility: CLINIC | Age: 45
End: 2020-07-20
Payer: OTHER MISCELLANEOUS

## 2020-07-20 DIAGNOSIS — R06.09 DOE (DYSPNEA ON EXERTION): ICD-10-CM

## 2020-07-20 DIAGNOSIS — K21.9 GASTROESOPHAGEAL REFLUX DISEASE, ESOPHAGITIS PRESENCE NOT SPECIFIED: Primary | ICD-10-CM

## 2020-07-20 DIAGNOSIS — U07.1 INFECTION DUE TO 2019 NOVEL CORONAVIRUS: ICD-10-CM

## 2020-07-20 PROCEDURE — 99214 OFFICE O/P EST MOD 30 MIN: CPT | Mod: 95 | Performed by: INTERNAL MEDICINE

## 2020-07-20 RX ORDER — ESOMEPRAZOLE MAGNESIUM 40 MG/1
40 CAPSULE, DELAYED RELEASE ORAL
Qty: 30 CAPSULE | Refills: 0 | Status: SHIPPED | OUTPATIENT
Start: 2020-07-20 | End: 2023-01-12

## 2020-07-20 NOTE — PROGRESS NOTES
"Trinity Chow is a 45 year old female who is being evaluated via a billable video visit.      The patient has been notified of following:     \"This video visit will be conducted via a call between you and your physician/provider. We have found that certain health care needs can be provided without the need for an in-person physical exam.  This service lets us provide the care you need with a video conversation.  If a prescription is necessary we can send it directly to your pharmacy.  If lab work is needed we can place an order for that and you can then stop by our lab to have the test done at a later time.    Video visits are billed at different rates depending on your insurance coverage.  Please reach out to your insurance provider with any questions.    If during the course of the call the physician/provider feels a video visit is not appropriate, you will not be charged for this service.\"    Patient has given verbal consent for Video visit? Yes  How would you like to obtain your AVS? [place]  If you are dropped from the video visit, the video invite should be resent to: Text to cell phone: [employer] [phone]  Will anyone else be joining your video visit? No      Subjective     Trinity Chow is a 45 year old female who presents today via video visit for the following health issues:    HPI    Recheck bradycardia, syncope  Patient presenting for follow-up.  She describes she is doing much better, her O2 sat did improve?  Since she started Eliquis.  She wonders whether she had \"micro thrombotic occlusion of vessels\" that improved with Eliquis.  Her shortness of breath is better but is still there.  Her oxygen is greater than 96% on room air even with exertion.  But her heart rate goes up to 120 with minimal exertion.  Before being hospitalized she had a syncopal episode that stated her admission, she describes that during the night she had some bradycardia while in the hospital down to the 40s sleeping.  She " describes also new onset of severe retrosternal?  Tightness/discomfort and feeling the need to burp, she had tried Pepto-Bismol with minimal relief.  She felt better with changing position with sleeping.  She had a similar episode last Friday.  She had not changed her diet.  She does not have any worsening of symptoms with exertion, heart rate usually runs 60 to 70/min or low 100 in general.  Last 2 weeks she felt severe dyspepsia and these atypical chest discomfort symptoms.  Patient describes she did receive Decadron IV during her hospitalization.  She describes her IL-6 was 316 and she has read that people with IL-6 greater than 80 ended up being on the ventilator due to COVID illness.  Patient only needed oxygen supplements during hospital stay.  Patient describes that she did receive the Ribasphere with the IL-6 monoclonal antibody she was enrolled in the study [versus placebo].  Video Start Time: 3:25 PM        There is no problem list on file for this patient.    History reviewed. No pertinent surgical history.    Social History     Tobacco Use     Smoking status: Never Smoker     Smokeless tobacco: Never Used   Substance Use Topics     Alcohol use: Not Currently     No family history on file.      Current Outpatient Medications   Medication Sig Dispense Refill     esomeprazole (NEXIUM) 40 MG DR capsule Take 1 capsule (40 mg) by mouth every morning (before breakfast) Take 30-60 minutes before eating. 30 capsule 0     apixaban ANTICOAGULANT (ELIQUIS ANTICOAGULANT) 2.5 MG tablet Take 1 tablet (2.5 mg) by mouth 2 times daily 60 tablet 0     No Known Allergies  Recent Labs   Lab Test 06/21/20  1014 06/19/20  1252   ALT 28  --    CR 0.68 0.80   GFRESTIMATED >90 88   GFRESTBLACK >90 >90   POTASSIUM 3.8 3.9      BP Readings from Last 3 Encounters:   06/21/20 110/74   06/19/20 101/66    Wt Readings from Last 3 Encounters:   06/21/20 70.3 kg (155 lb)                    Reviewed and updated as needed this visit by  Provider  Tobacco  Allergies  Meds  Med Hx  Surg Hx  Soc Hx        Review of Systems   Constitutional, HEENT, cardiovascular, pulmonary, gi and gu systems are negative, except as otherwise noted.      Objective             Physical Exam     GENERAL: Healthy, alert and no distress  EYES: Eyes grossly normal to inspection.  No discharge or erythema, or obvious scleral/conjunctival abnormalities.  RESP: No audible wheeze, cough, or visible cyanosis.  No visible retractions or increased work of breathing.    SKIN: Visible skin clear. No significant rash, abnormal pigmentation or lesions.  NEURO: Cranial nerves grossly intact.  Mentation and speech appropriate for age.  PSYCH: Mentation appears normal, affect normal/bright, judgement and insight intact, normal speech and appearance well-groomed.      Diagnostic Test Results:  Labs reviewed in Epic        Trinity was seen today for recheck.    Diagnoses and all orders for this visit:    Gastroesophageal reflux disease, esophagitis presence not specified  -     esomeprazole (NEXIUM) 40 MG DR capsule; Take 1 capsule (40 mg) by mouth every morning (before breakfast) Take 30-60 minutes before eating.     (dyspnea on exertion)  -     PULMONARY MEDICINE REFERRAL  -     Echocardiogram Complete; Future  -     XR Chest 2 Views; Future  -     General PFT Lab (Please always keep checked); Future    Infection due to 2019 novel coronavirus  -     PULMONARY MEDICINE REFERRAL  -     XR Chest 2 Views; Future  -     General PFT Lab (Please always keep checked); Future      Advised patient to refer to pulmonary specialty for further recommendation I did recommend the next 2 weeks to do a chest x-ray and a baseline echocardiogram and preferably a pulmonary function test as well.  She was in agreement.  She is concerned if her symptoms are suggestive of pericarditis or cardiac etiology.  Probably her atypical chest symptoms are currently related to GERD, advised to start on PPI  therapy and see how she improves with such treatment..  Patient in agreement with plan.  All questions answered.    Video-Visit Details    Type of service:  Video Visit    Video End Time:3:58 PM    Originating Location (pt. Location): Home    Distant Location (provider location):  [facility]     [facility]    No follow-ups on file.       Carmen Kemp MD

## 2020-07-21 ENCOUNTER — COMMUNICATION - HEALTHEAST (OUTPATIENT)
Dept: FAMILY MEDICINE | Facility: CLINIC | Age: 45
End: 2020-07-21

## 2020-07-30 ENCOUNTER — TELEPHONE (OUTPATIENT)
Dept: CARDIOLOGY | Facility: CLINIC | Age: 45
End: 2020-07-30

## 2020-07-30 NOTE — TELEPHONE ENCOUNTER
PATIENT WELLNESS TELEPHONE SCREENING     Step 1 Screening Questions    In the past 3 weeks, have you been exposed to someone with a suspected or known illness?  COVID-19? No  Chickenpox? No   Measles? No  Pertussis? No    In the past 2 weeks, have you had any of the following symptoms?   Fever/Chills? No   Cough? No   Shortness of breath? Yes:    New loss of taste or smell? No  Sore throat? No  Muscle or body aches? No  Headaches? No  Fatigue? Yes:   Vomiting or diarrhea? No    Step 2 Screening Results (Skip if the patient is negative for symptoms)    If the patient is positive for new or worsening symptoms, contact the ordering provider to determine if the procedure is deemed necessary. Determine if patient can be re-scheduled when the patient is symptom free or has a negative COVID test.     If ordering provider deems the procedure is necessary, notify your manager/supervisor. Provide the patient with the procedural department phone number and inform the patient to call the procedural department upon arrival.  The patient will be registered over the phone.    Step 3 Review Visitor Policy  Patient informed of the updated visitor policy   1 visitor allowed per patient   Visitor must screen negative for COVID symptoms   Visitor must wear a mask    Antonio Jordan

## 2020-07-31 ENCOUNTER — HOSPITAL ENCOUNTER (OUTPATIENT)
Dept: CARDIOLOGY | Facility: CLINIC | Age: 45
Discharge: HOME | End: 2020-07-31
Attending: INTERNAL MEDICINE | Admitting: INTERNAL MEDICINE
Payer: OTHER MISCELLANEOUS

## 2020-07-31 DIAGNOSIS — R06.09 DOE (DYSPNEA ON EXERTION): ICD-10-CM

## 2020-07-31 PROCEDURE — 93308 TTE F-UP OR LMTD: CPT

## 2020-07-31 PROCEDURE — 93308 TTE F-UP OR LMTD: CPT | Mod: 26 | Performed by: INTERNAL MEDICINE

## 2020-07-31 PROCEDURE — 93321 DOPPLER ECHO F-UP/LMTD STD: CPT | Mod: 26 | Performed by: INTERNAL MEDICINE

## 2020-07-31 PROCEDURE — 93325 DOPPLER ECHO COLOR FLOW MAPG: CPT | Mod: 26 | Performed by: INTERNAL MEDICINE

## 2020-08-26 NOTE — TELEPHONE ENCOUNTER
RECORDS RECEIVED FROM: Internal /CE   DATE RECEIVED: 9.8.20    NOTES STATUS DETAILS   OFFICE NOTE from referring provider Internal  Viridiana CAMEJO    OFFICE NOTE from other specialist na    DISCHARGE SUMMARY from hospital na    DISCHARGE REPORT from the ER CE/Internal  HE- 6.21.20   Internal- 6.21.20, 6.19.20    MEDICATION LIST Internal     IMAGING  (NEED IMAGES AND REPORTS)     CT SCAN Internal  6.19.20   CHEST XRAY (CXR) Internal  6.21.20, 6.19.20   TESTS     PULMONARY FUNCTION TESTING (PFT) In process         Action 8.26.20 sv   Action Taken Message sent to CC to call pt and help schedule PFT

## 2020-09-20 ENCOUNTER — PRE VISIT (OUTPATIENT)
Dept: PULMONOLOGY | Facility: CLINIC | Age: 45
End: 2020-09-20

## 2020-10-10 NOTE — TELEPHONE ENCOUNTER
RE: request kindly your medical opinion on this patient- VTE porophylaxis post COVID hospitalization.  Received: 3 months ago  Message Contents   Nadira Fu RPH Sayegh, Kamil Nadim, MD             The IMPROVE VTE can be found here: [email or web address]_score/index.[email or web address]     Padua can be found here: [email or web address]/risk_score/index.[email or web address]     Nadira Fu PharmD, AUBREY   Medication Therapy Management Provider   Pager: [phone]    Previous Messages    ----- Message -----   From: Carmen Kemp MD   Sent: 7/1/2020   9:09 PM CDT   To: Nadira Fu RPH   Subject: RE: request kindly your medical opinion on t*     Thanks Nadira appreciate your follow-up message.  I already sent a prescription for Eliquis low-dose 2.5 twice daily , but I will let patient know about your advice and recommendation and scoring system you used that she is low risk.  Thank you for reaching out to other colleagues as well.  Where can I find these scoring tools [Padua and Improve VTE BREANNE).  Thank you again  Carmen   ----- Message -----   From: Nadira Fu RPH   Sent: 7/1/2020   8:41 PM CDT   To: Carmen Kemp MD   Subject: RE: request kindly your medical opinion on t*     Hi Dr. Kemp,     This is a great question.  I did a little bit of research and also chatted with a few of my colleagues who have been doing some additional presentations on medication use and COVID-19.  We all agreed that this pt likely wouldn't not need anticoagulation post discharge.  There are a few different scoring tools (Padua and Improve VTE BREANNE), and on all of them she appears to come out as low risk, no anticoagulation needed.     Let me know if you have any additional questions.  Thanks!   Nadira Fu PharmD, AUBREY   Medication Therapy Management Provider   Pager: [phone]   ----- Message -----   From: Carmen Kemp MD   Sent: 7/1/2020   3:52 PM CDT   To: Nadira Fu [facility],  Carmen Kemp MD   Subject: request kindly your medical opinion on this *     Demetrio Waddell , I want to take your opinion about this patient, seen today virtual visit she is  that had COVID infection and she is following up with us, she is not on oxygen post hospital discharge, she needed oxygen during her hospitalization at [place] ,she had some dyspnea, her d-dimer was 0.6 on discharge, elevated fibrinogen, has normal platelet count, she was on prophylactic anticoagulation during hospitalization.  She was not discharged on anticoagulation, we discussed starting her back on VTE prophylaxis with a lower dose anticoagulation such as Eliquis 2.5 mg twice daily per the guidelines.  Appreciate your feedback.  *[She said she had consulted with 1 of her colleagues critical care specialist on the [facility] who advised her to be on anticoagulation]  Thank you.  Carmen

## 2020-11-14 ENCOUNTER — HEALTH MAINTENANCE LETTER (OUTPATIENT)
Age: 45
End: 2020-11-14

## 2021-02-06 ENCOUNTER — HEALTH MAINTENANCE LETTER (OUTPATIENT)
Age: 46
End: 2021-02-06

## 2021-06-09 NOTE — TELEPHONE ENCOUNTER
I called Trinity Chow for day 22 follow up visit as part of the Remdesivir trial.  Overall she is doing okay, and is not on home oxygen.  she is feeling alright, still has ongoing issues with shortness of breath when walking more than a couple blocks as well as night sweats. she does have limitations to her physical activities (the shortness of breath when walking more than a couple of blocks). Trinity declined to speak with a study doctor regarding her ongoing issues.     Patient was not seen in person for blood draw and vital sign assessment at [facility] drive up location.     Dolly DOSS

## 2021-06-09 NOTE — PROGRESS NOTES
Clinic Care Coordination Contact    Situation: Patient chart reviewed by care coordinator.    Background: Discharged from [facility] with COVID.  Does not have PCP listed.  CC to call and review discharge instructions and to assist with setting up PCP appointment.      Assessment: Called patient who is feeling well.  No questions about discharge instruction.  She has sent a request for an appointment to [facility] to get an appointment at the [facility]. Offered to assist with setting up the appointment and she is comfortable waiting for contact today.  Writer will do chart review to make sure she gets appointment scheduled by tomorrow.  If not, writer can assist.      Plan/Recommendations: Will ensure patient gets follow up with primary care.      Claudia Castillo [facility]  Clinic Care Coordinator  [phone]      Clinic Care Coordination Contact    Situation: Patient chart reviewed by care coordinator.    Assessment: No appointment was set up. Called patient and she asked CC to set up an appointment for anytime this week. Called scheduling and set up phone appointment for July 1 at 11 AM with Carmen Kemp.  Called patient and left this information on her voice mail.     Plan/Recommendations: no further outreach.     Claudia Castillo [facility] Care Coordinator  [phone]

## 2021-06-09 NOTE — TELEPHONE ENCOUNTER
I called Trinity Chow for day 15 follow up visit as part of the Remdesivir trial.  Overall she is doing well, and is not on home oxygen.  she has had adverse medical events since being discharged which are night sweats and altered taste smell; she does feel that these symptoms are improving. Trinity declined to have a study doctor call and follow-up with her regarding these sympoms. she does have limitations to her physical activities as she does get short of breath when walking more than 1 or 2 blocks.     Patient was not seen in person for blood draw and vital sign assessment at [facility] drive up location.     Dolly DOSS

## 2021-06-09 NOTE — TELEPHONE ENCOUNTER
I called Trinity Chow for day 29 follow up visit as part of the Remdesivir trial.  Overall she is doing well, and is not on home oxygen.  she is feeling well and has had adverse medical events since being discharged. She continues to have night sweats and shortness of breath when walking more than a couple of blocks. She has recently had some atypical chest pains which she believes is from dyspepsia.  she does have limitations to her physical activities in regards to her shortness of breath. She does not wish to speak with any of the study doctors in regards to any of these issues. She doesn't have any questions for the study team, I thanked her for her study participation.    Patient was not seen in person for blood draw and vital sign assessment [facility] drive up location.     Dolly CERRATO.

## 2021-06-16 PROBLEM — J12.82 PNEUMONIA DUE TO COVID-19 VIRUS: Status: ACTIVE | Noted: 2020-06-21

## 2021-06-16 PROBLEM — U07.1 PNEUMONIA DUE TO COVID-19 VIRUS: Status: ACTIVE | Noted: 2020-06-21

## 2021-09-12 ENCOUNTER — HEALTH MAINTENANCE LETTER (OUTPATIENT)
Age: 46
End: 2021-09-12

## 2022-01-02 ENCOUNTER — HEALTH MAINTENANCE LETTER (OUTPATIENT)
Age: 47
End: 2022-01-02

## 2022-02-27 ENCOUNTER — HEALTH MAINTENANCE LETTER (OUTPATIENT)
Age: 47
End: 2022-02-27

## 2022-09-16 ENCOUNTER — TELEPHONE (OUTPATIENT)
Dept: INTERNAL MEDICINE | Facility: CLINIC | Age: 47
End: 2022-09-16

## 2022-09-16 DIAGNOSIS — M25.562 ACUTE PAIN OF BOTH KNEES: Primary | ICD-10-CM

## 2022-09-16 DIAGNOSIS — Z13.6 SCREENING FOR ISCHEMIC HEART DISEASE: ICD-10-CM

## 2022-09-16 DIAGNOSIS — Z11.59 ENCOUNTER FOR HEPATITIS C SCREENING TEST FOR LOW RISK PATIENT: ICD-10-CM

## 2022-09-16 DIAGNOSIS — Z00.00 ROUTINE GENERAL MEDICAL EXAMINATION AT A HEALTH CARE FACILITY: ICD-10-CM

## 2022-09-16 DIAGNOSIS — M25.561 ACUTE PAIN OF BOTH KNEES: Primary | ICD-10-CM

## 2022-09-16 DIAGNOSIS — Z83.3 FAMILY HISTORY OF DIABETES MELLITUS: ICD-10-CM

## 2022-09-16 DIAGNOSIS — M79.10 MUSCLE ACHE: ICD-10-CM

## 2022-09-19 ENCOUNTER — OFFICE VISIT (OUTPATIENT)
Dept: INTERNAL MEDICINE | Facility: CLINIC | Age: 47
End: 2022-09-19
Payer: COMMERCIAL

## 2022-09-19 VITALS
TEMPERATURE: 97.6 F | OXYGEN SATURATION: 99 % | HEIGHT: 66 IN | DIASTOLIC BLOOD PRESSURE: 60 MMHG | RESPIRATION RATE: 14 BRPM | WEIGHT: 161 LBS | HEART RATE: 67 BPM | SYSTOLIC BLOOD PRESSURE: 106 MMHG | BODY MASS INDEX: 25.88 KG/M2

## 2022-09-19 DIAGNOSIS — Z12.11 SPECIAL SCREENING FOR MALIGNANT NEOPLASMS, COLON: ICD-10-CM

## 2022-09-19 DIAGNOSIS — R06.09 EXERTIONAL DYSPNEA: ICD-10-CM

## 2022-09-19 DIAGNOSIS — M25.561 ACUTE PAIN OF BOTH KNEES: ICD-10-CM

## 2022-09-19 DIAGNOSIS — M79.10 MUSCLE ACHE: ICD-10-CM

## 2022-09-19 DIAGNOSIS — Z00.00 ROUTINE GENERAL MEDICAL EXAMINATION AT A HEALTH CARE FACILITY: Primary | ICD-10-CM

## 2022-09-19 DIAGNOSIS — E23.0 HYPOGONADOTROPIC HYPOGONADISM (H): ICD-10-CM

## 2022-09-19 DIAGNOSIS — Z11.59 ENCOUNTER FOR HEPATITIS C SCREENING TEST FOR LOW RISK PATIENT: ICD-10-CM

## 2022-09-19 DIAGNOSIS — Z83.3 FAMILY HISTORY OF DIABETES MELLITUS: ICD-10-CM

## 2022-09-19 DIAGNOSIS — Z13.6 SCREENING FOR ISCHEMIC HEART DISEASE: ICD-10-CM

## 2022-09-19 DIAGNOSIS — M25.562 ACUTE PAIN OF BOTH KNEES: ICD-10-CM

## 2022-09-19 DIAGNOSIS — Z12.31 ENCOUNTER FOR SCREENING MAMMOGRAM FOR BREAST CANCER: ICD-10-CM

## 2022-09-19 LAB
ALBUMIN SERPL-MCNC: 3.7 G/DL (ref 3.4–5)
ALP SERPL-CCNC: 76 U/L (ref 40–150)
ALT SERPL W P-5'-P-CCNC: 26 U/L (ref 0–50)
ANION GAP SERPL CALCULATED.3IONS-SCNC: 8 MMOL/L (ref 3–14)
AST SERPL W P-5'-P-CCNC: 18 U/L (ref 0–45)
B BURGDOR IGG+IGM SER QL: 0.04
BASOPHILS # BLD AUTO: 0.1 10E3/UL (ref 0–0.2)
BASOPHILS NFR BLD AUTO: 1 %
BILIRUB SERPL-MCNC: 0.4 MG/DL (ref 0.2–1.3)
BUN SERPL-MCNC: 15 MG/DL (ref 7–30)
CALCIUM SERPL-MCNC: 9.5 MG/DL (ref 8.5–10.1)
CHLORIDE BLD-SCNC: 105 MMOL/L (ref 94–109)
CHOLEST SERPL-MCNC: 259 MG/DL
CK SERPL-CCNC: 65 U/L (ref 30–225)
CO2 SERPL-SCNC: 25 MMOL/L (ref 20–32)
CREAT SERPL-MCNC: 0.92 MG/DL (ref 0.52–1.04)
CRP SERPL-MCNC: <3 MG/L
DEPRECATED CALCIDIOL+CALCIFEROL SERPL-MC: 28 UG/L (ref 20–75)
EOSINOPHIL # BLD AUTO: 0.1 10E3/UL (ref 0–0.7)
EOSINOPHIL NFR BLD AUTO: 2 %
ERYTHROCYTE [DISTWIDTH] IN BLOOD BY AUTOMATED COUNT: 13.3 % (ref 10–15)
ERYTHROCYTE [SEDIMENTATION RATE] IN BLOOD BY WESTERGREN METHOD: 10 MM/HR (ref 0–20)
FASTING STATUS PATIENT QL REPORTED: YES
GFR SERPL CREATININE-BSD FRML MDRD: 76 ML/MIN/1.73M2
GLUCOSE BLD-MCNC: 103 MG/DL (ref 70–99)
HBA1C MFR BLD: 5.4 % (ref 0–5.6)
HCT VFR BLD AUTO: 41.6 % (ref 35–47)
HCV AB SERPL QL IA: NONREACTIVE
HDLC SERPL-MCNC: 39 MG/DL
HGB BLD-MCNC: 13.2 G/DL (ref 11.7–15.7)
IMM GRANULOCYTES # BLD: 0 10E3/UL
IMM GRANULOCYTES NFR BLD: 0 %
LDLC SERPL CALC-MCNC: 185 MG/DL
LYMPHOCYTES # BLD AUTO: 3.9 10E3/UL (ref 0.8–5.3)
LYMPHOCYTES NFR BLD AUTO: 49 %
MCH RBC QN AUTO: 27.3 PG (ref 26.5–33)
MCHC RBC AUTO-ENTMCNC: 31.7 G/DL (ref 31.5–36.5)
MCV RBC AUTO: 86 FL (ref 78–100)
MONOCYTES # BLD AUTO: 0.4 10E3/UL (ref 0–1.3)
MONOCYTES NFR BLD AUTO: 5 %
NEUTROPHILS # BLD AUTO: 3.4 10E3/UL (ref 1.6–8.3)
NEUTROPHILS NFR BLD AUTO: 43 %
NONHDLC SERPL-MCNC: 220 MG/DL
NRBC # BLD AUTO: 0 10E3/UL
NRBC BLD AUTO-RTO: 0 /100
PLATELET # BLD AUTO: 294 10E3/UL (ref 150–450)
POTASSIUM BLD-SCNC: 3.7 MMOL/L (ref 3.4–5.3)
PROT SERPL-MCNC: 7.4 G/DL (ref 6.8–8.8)
RBC # BLD AUTO: 4.83 10E6/UL (ref 3.8–5.2)
SODIUM SERPL-SCNC: 138 MMOL/L (ref 133–144)
TRIGL SERPL-MCNC: 176 MG/DL
TSH SERPL DL<=0.005 MIU/L-ACNC: 1.29 MU/L (ref 0.4–4)
URATE SERPL-MCNC: 5.8 MG/DL (ref 2.6–6)
WBC # BLD AUTO: 7.9 10E3/UL (ref 4–11)

## 2022-09-19 PROCEDURE — 99000 SPECIMEN HANDLING OFFICE-LAB: CPT | Performed by: INTERNAL MEDICINE

## 2022-09-19 PROCEDURE — 83036 HEMOGLOBIN GLYCOSYLATED A1C: CPT | Performed by: INTERNAL MEDICINE

## 2022-09-19 PROCEDURE — 84550 ASSAY OF BLOOD/URIC ACID: CPT | Performed by: INTERNAL MEDICINE

## 2022-09-19 PROCEDURE — 99396 PREV VISIT EST AGE 40-64: CPT | Performed by: INTERNAL MEDICINE

## 2022-09-19 PROCEDURE — 82306 VITAMIN D 25 HYDROXY: CPT | Performed by: INTERNAL MEDICINE

## 2022-09-19 PROCEDURE — 85652 RBC SED RATE AUTOMATED: CPT | Performed by: INTERNAL MEDICINE

## 2022-09-19 PROCEDURE — 86618 LYME DISEASE ANTIBODY: CPT | Performed by: INTERNAL MEDICINE

## 2022-09-19 PROCEDURE — 86140 C-REACTIVE PROTEIN: CPT | Performed by: INTERNAL MEDICINE

## 2022-09-19 PROCEDURE — 36415 COLL VENOUS BLD VENIPUNCTURE: CPT | Performed by: INTERNAL MEDICINE

## 2022-09-19 PROCEDURE — 86038 ANTINUCLEAR ANTIBODIES: CPT | Performed by: INTERNAL MEDICINE

## 2022-09-19 PROCEDURE — 86200 CCP ANTIBODY: CPT | Performed by: INTERNAL MEDICINE

## 2022-09-19 PROCEDURE — 82550 ASSAY OF CK (CPK): CPT | Performed by: INTERNAL MEDICINE

## 2022-09-19 PROCEDURE — 80050 GENERAL HEALTH PANEL: CPT | Performed by: INTERNAL MEDICINE

## 2022-09-19 PROCEDURE — 86803 HEPATITIS C AB TEST: CPT | Performed by: INTERNAL MEDICINE

## 2022-09-19 PROCEDURE — 86431 RHEUMATOID FACTOR QUANT: CPT | Performed by: INTERNAL MEDICINE

## 2022-09-19 PROCEDURE — 82085 ASSAY OF ALDOLASE: CPT | Mod: 90 | Performed by: INTERNAL MEDICINE

## 2022-09-19 PROCEDURE — 80061 LIPID PANEL: CPT | Performed by: INTERNAL MEDICINE

## 2022-09-19 ASSESSMENT — ENCOUNTER SYMPTOMS
SHORTNESS OF BREATH: 0
HEARTBURN: 0
JOINT SWELLING: 0
EYE PAIN: 0
CONSTIPATION: 0
HEMATURIA: 0
NAUSEA: 0
NERVOUS/ANXIOUS: 0
PARESTHESIAS: 1
COUGH: 0
WEAKNESS: 0
HEMATOCHEZIA: 0
SORE THROAT: 0
FEVER: 0
PALPITATIONS: 0
CHILLS: 0
MYALGIAS: 1
ABDOMINAL PAIN: 0
HEADACHES: 0
DYSURIA: 0
ARTHRALGIAS: 1
DIZZINESS: 0
DIARRHEA: 0
FREQUENCY: 0

## 2022-09-19 NOTE — PROGRESS NOTES
Dr Jama's note    Patient's instructions / PLAN:                                                            ASSESSMENT & PLAN:                                                      (Z00.00) Routin general medical examination at a health care facility  (primary encounter diagnosis)  Comment:   Plan:     (E23.0) Hypogonadotropic hypogonadism (H)  Comment:   Plan: DX Hip/Pelvis/Spine            (R06.00) Exertional dyspnea  Comment: after Covid infection   Plan: General PFT Lab (Please always keep checked),         Pulmonary Function Test            (M25.561,  M25.562) Acute pain of both knees  (M79.10) Muscle ache  Comment: no swelling. After a trip to care for ill mother  Plan: Orthopedic  Referral              (Z12.11) Special screening for malignant neoplasms, colon  Comment:   Plan: Adult GI  Referral - Procedure Only            (Z12.31) Encounter for screening mammogram for breast cancer  Comment:   Plan: MA Screening Digital Bilateral, Adult GI          Referral - Procedure Only,         COLOGUARD(EXACT SCIENCES)               Chief Complaint:                                                        Annual exam  Follow up chronic medical problems      SUBJECTIVE:                                                    History of present illness     We reviewed the chronic medical problems as above.   I reviewed the recent tests results in Epic       ROS:     See below    General: Negative for fever, chills, major weight changes, fatigue  Skin: Negative for rashes, abnormal spots  Eyes: Negative for blurred or double vision  ENT/mouth: Negative for sinuses discomfort, earache, sore throat  Respiratory: Negative for cough, wheezes, chronic lung disease  Cardiovascular: Negative for rest or exertional chest pain, shortness of breath, palpitations, leg edema,   Gastrointestinal: Negative for vomiting, abdominal pain, heartburn, blood in stool, diarrhea, constipation  Genitourinary: Negative  for urinary frequency, blood in urine, history of kidney stones  Female: Negative for abnormal vaginal bleeding, vaginal discharge  Neuro: Negative for headaches, numbness, tingling, weakness in arms or legs, history of seizure, recent syncope  Psychiatry: Negative for depression, anxiety, suicidal thoughts  Endo: Negative for known thyroid disease, diabetes.  Hemato/Lymph: Negative for nodes, easy bleeding, history of DVT, blood transfusion  Musculoskeletal: Negative for joint swelling, back pain. Pos for knee pain      PMHx: - reviewed  Past Medical History:   Diagnosis Date     Hypogonadotropic hypogonadism (H)        PSHx: reviewed  Past Surgical History:   Procedure Laterality Date      SECTION          Soc Hx: No daily alcohol, no smoking  Social History     Socioeconomic History     Marital status:      Spouse name: Not on file     Number of children: Not on file     Years of education: Not on file     Highest education level: Not on file   Occupational History     Not on file   Tobacco Use     Smoking status: Never Smoker     Smokeless tobacco: Never Used   Substance and Sexual Activity     Alcohol use: Not Currently     Drug use: Never     Sexual activity: Yes     Partners: Male   Other Topics Concern     Not on file   Social History Narrative     Not on file     Social Determinants of Health     Financial Resource Strain: Not on file   Food Insecurity: Not on file   Transportation Needs: Not on file   Physical Activity: Not on file   Stress: Not on file   Social Connections: Not on file   Intimate Partner Violence: Not on file   Housing Stability: Not on file        Fam Hx: reviewed  No family history on file.      Screening: reviewed      All: reviewed    Meds: reviewed  Current Outpatient Medications   Medication Sig Dispense Refill     apixaban ANTICOAGULANT (ELIQUIS ANTICOAGULANT) 2.5 MG tablet Take 1 tablet (2.5 mg) by mouth 2 times daily 60 tablet 0     esomeprazole (NEXIUM) 40 MG   "capsule Take 1 capsule (40 mg) by mouth every morning (before breakfast) Take 30-60 minutes before eating. 30 capsule 0       OBJECTIVE:                                                    Physical Exam :  Pulse 67, resp. rate 14, height 1.676 m (5' 6\"), weight 73 kg (161 lb), SpO2 99 %.    Blood pressure 106/60, pulse 67, resp. rate 14, height 1.676 m (5' 6\"), weight 73 kg (161 lb), SpO2 99 %.     NAD, appears comfortable  Skin clear, no rashes  Neck: supple, no JVD,  no thyroidmegaly  Lymph nodes non palpable in the cervical, supraclavicular axillaries,   Chest: clear to auscultation with good respiratory effort  Cardiac: S1S2, RRR, no mgr appreciated  Abdomen: soft, not tender, not distended, audible bowel sound, no hepatosplenomegaly, no palpable masses, no abdominal bruits  Extremities: no cyanosis, clubbing or edema.   Neuro: A, Ox3, no focal signs.  Breast examdeferred, she prefers to schedule it with OBGYN MD     Pelvic exam: deferred, she prefers to schedule it with OBGYN MD Latasha Jama MD  Internal Medicine        SUBJECTIVE:   CC: Trinity is an 47 year old who presents for preventive health visit.         HPI            Today's PHQ-2 Score:   PHQ-2 ( 1999 Pfizer) 9/19/2022   Q1: Little interest or pleasure in doing things 0   Q2: Feeling down, depressed or hopeless 0   PHQ-2 Score 0   PHQ-2 Total Score (12-17 Years)- Positive if 3 or more points; Administer PHQ-A if positive -   Q1: Little interest or pleasure in doing things Not at all   Q2: Feeling down, depressed or hopeless Not at all   PHQ-2 Score 0       Abuse: Current or Past (Physical, Sexual or Emotional) - No  Do you feel safe in your environment? Yes    Have you ever done Advance Care Planning? (For example, a Health Directive, POLST, or a discussion with a medical provider or your loved ones about your wishes):     Social History     Tobacco Use     Smoking status: Never Smoker     Smokeless tobacco: Never Used   Substance Use Topics " "    Alcohol use: Not Currently         Alcohol Use 9/19/2022   Prescreen: >3 drinks/day or >7 drinks/week? No       Reviewed orders with patient.  Reviewed health maintenance and updated orders accordingly - Yes  Labs reviewed in EPIC    Breast Cancer Screening:    FHS-7:   Breast CA Risk Assessment (FHS-7) 9/19/2022   Did any of your first-degree relatives have breast or ovarian cancer? No   Did any of your relatives have bilateral breast cancer? No   Did any man in your family have breast cancer? No   Did any woman in your family have breast and ovarian cancer? Yes   Did any woman in your family have breast cancer before age 50 y? Unknown   Do you have 2 or more relatives with breast and/or ovarian cancer? No   Do you have 2 or more relatives with breast and/or bowel cancer? No         Pertinent mammograms are reviewed under the imaging tab.    History of abnormal Pap smear:      Reviewed and updated as needed this visit by clinical staff                    Reviewed and updated as needed this visit by Provider                       Patient has been advised of split billing requirements and indicates understanding: Yes    COUNSELING:  Reviewed preventive health counseling, as reflected in patient instructions       Regular exercise       Healthy diet/nutrition    Estimated body mass index is 24.59 kg/m  as calculated from the following:    Height as of 6/21/20: 1.702 m (5' 7\").    Weight as of 6/21/20: 71.2 kg (157 lb).        She reports that she has never smoked. She has never used smokeless tobacco.    Answers for HPI/ROS submitted by the patient on 9/19/2022  abdominal pain: No  Blood in stool: No  Blood in urine: No  chest pain: No  chills: No  congestion: Yes  constipation: No  cough: No  diarrhea: No  dizziness: No  ear pain: No  eye pain: No  nervous/anxious: No  fever: No  frequency: No  genital sores: No  headaches: No  hearing loss: No  heartburn: No  arthralgias: Yes  joint swelling: No  peripheral " edema: No  mood changes: No  myalgias: Yes  nausea: No  dysuria: No  palpitations: No  Skin sensation changes: Yes  sore throat: No  urgency: No  rash: No  shortness of breath: No  visual disturbance: No  weakness: No        Counseling Resources:  ATP IV Guidelines  Pooled Cohorts Equation Calculator  Breast Cancer Risk Calculator  BRCA-Related Cancer Risk Assessment: FHS-7 Tool  FRAX Risk Assessment  ICSI Preventive Guidelines  Dietary Guidelines for Americans, 2010  [employer] MyPlate  ASA Prophylaxis  Lung CA Screening    Latasha Bowie MD  [facility]

## 2022-09-20 LAB
ALDOLASE SERPL-CCNC: 2 U/L
ANA SER QL IF: NEGATIVE
CCP AB SER IA-ACNC: 0.9 U/ML
RHEUMATOID FACT SER NEPH-ACNC: 7 IU/ML

## 2022-10-07 NOTE — TELEPHONE ENCOUNTER
DIAGNOSIS: B/L knee pain / Latasha Bowie MD -  referring pt to be seen by Dr. Simeon / [facility] / no imaging   APPOINTMENT DATE: 10.12.22   NOTES STATUS DETAILS   OFFICE NOTE from referring provider Internal 9.19.22 Dr Latasha Bowie, [facility] IM   MEDICATION LIST Internal    LABS     CBC/DIFF Care Everywhere

## 2022-10-11 DIAGNOSIS — M25.561 BILATERAL CHRONIC KNEE PAIN: Primary | ICD-10-CM

## 2022-10-11 DIAGNOSIS — G89.29 BILATERAL CHRONIC KNEE PAIN: Primary | ICD-10-CM

## 2022-10-11 DIAGNOSIS — M25.562 BILATERAL CHRONIC KNEE PAIN: Primary | ICD-10-CM

## 2022-10-12 ENCOUNTER — ANCILLARY PROCEDURE (OUTPATIENT)
Dept: MRI IMAGING | Facility: CLINIC | Age: 47
End: 2022-10-12
Attending: ORTHOPAEDIC SURGERY
Payer: COMMERCIAL

## 2022-10-12 ENCOUNTER — ANCILLARY PROCEDURE (OUTPATIENT)
Dept: GENERAL RADIOLOGY | Facility: CLINIC | Age: 47
End: 2022-10-12
Attending: ORTHOPAEDIC SURGERY
Payer: COMMERCIAL

## 2022-10-12 ENCOUNTER — OFFICE VISIT (OUTPATIENT)
Dept: ORTHOPEDICS | Facility: CLINIC | Age: 47
End: 2022-10-12
Attending: INTERNAL MEDICINE
Payer: COMMERCIAL

## 2022-10-12 ENCOUNTER — PRE VISIT (OUTPATIENT)
Dept: ORTHOPEDICS | Facility: CLINIC | Age: 47
End: 2022-10-12

## 2022-10-12 VITALS — HEIGHT: 66 IN | BODY MASS INDEX: 25.88 KG/M2 | WEIGHT: 161 LBS

## 2022-10-12 DIAGNOSIS — M25.561 ACUTE PAIN OF BOTH KNEES: ICD-10-CM

## 2022-10-12 DIAGNOSIS — G89.29 BILATERAL CHRONIC KNEE PAIN: ICD-10-CM

## 2022-10-12 DIAGNOSIS — M25.562 BILATERAL CHRONIC KNEE PAIN: ICD-10-CM

## 2022-10-12 DIAGNOSIS — S83.241A ACUTE MEDIAL MENISCUS TEAR OF RIGHT KNEE: ICD-10-CM

## 2022-10-12 DIAGNOSIS — M79.10 MUSCLE ACHE: ICD-10-CM

## 2022-10-12 DIAGNOSIS — M25.562 ACUTE PAIN OF BOTH KNEES: ICD-10-CM

## 2022-10-12 DIAGNOSIS — M25.561 BILATERAL CHRONIC KNEE PAIN: ICD-10-CM

## 2022-10-12 DIAGNOSIS — S83.241A ACUTE MEDIAL MENISCUS TEAR OF RIGHT KNEE, INITIAL ENCOUNTER: Primary | ICD-10-CM

## 2022-10-12 PROCEDURE — 99203 OFFICE O/P NEW LOW 30 MIN: CPT | Mod: GC | Performed by: ORTHOPAEDIC SURGERY

## 2022-10-12 PROCEDURE — 73721 MRI JNT OF LWR EXTRE W/O DYE: CPT | Mod: RT | Performed by: RADIOLOGY

## 2022-10-12 PROCEDURE — 73562 X-RAY EXAM OF KNEE 3: CPT | Mod: RT | Performed by: RADIOLOGY

## 2022-10-12 NOTE — NURSING NOTE
"Reason For Visit:   Chief Complaint   Patient presents with     Consult     Bilateral knee       ?  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  Work status?  Full time.  Date of injury: About 5 weeks ago  Type of injury: None.  Date of surgery: NA  Type of surgery: NA.    She doesn't recall an injury to her knees but notes an increase in pain recently with any bending and pain keeping her up at night. She does recall a fall during a hike about 6 weeks ago. Her right knee is worse than left knee. The majority of her pain is on the medial knee. She reports a catching and instability and slight swelling.     SANE Score  Left Knee: 45-50  Right Knee: 20-30    Pain Assessment  Patient Currently in Pain: Yes  0-10 Pain Scale: 10 (at worst)  Primary Pain Location: Knee    Ht 1.676 m (5' 6\")   Wt 73 kg (161 lb)   BMI 25.99 kg/m         No Known Allergies    Current Outpatient Medications   Medication     apixaban ANTICOAGULANT (ELIQUIS ANTICOAGULANT) 2.5 MG tablet     esomeprazole (NEXIUM) 40 MG DR capsule     No current facility-administered medications for this visit.         Ora Knight, ATC    "

## 2022-10-12 NOTE — PROGRESS NOTES
CC: Right greater than left knee pain    HPI: Patient is a 47-year-old female who presents us today with right greater than left knee pain.  Does not recall any specific injury or traumatic cause.  She said she began noticing this pain in late August.  She thinks it correlates with a hiking trip.  She is now having pain in the anterior medial aspect of her knee.  She also noticed some pain and clicking on the medial aspect of her knee.  Is worse with ascending and descending stairs, sitting on the floor, and at night.  She is tried p.o. NSAIDs and topicals which have helped but not eliminate her pain.  She has not undergone physical therapy or any intra-articular injection for this.  She denies any previous injury or surgery to either leg.    PMH: None    PSH:     All: none    Meds: None    SH: She is a hospitalist physician at a local hospital.  She does not smoke.    Act: She enjoys walking and hiking    ROS: A complete review of systems was performed and was otherwise negative for General, HEENT, CV, pulm, GI, , neuro, endocrine, heme, lymph, musculoskeletal, or psych.    O:  PE:  RLE: Free and painless range of motion of the hip.  Mild joint effusion.  0 to 130 degrees of passive motion.  Pain at the extremes of motion.  20 degrees shy of full extension due to pain.  Positive patellar grind for pain.  Mild pain to palpation over the medial joint line.  No specific pain to palpation over the lateral joint line.  Ia Lachman.  Stable anterior posterior drawer.  Stable to varus and valgus stress at 0 and 30 degrees of knee flexion.  5/5 knee flexion extension.    Imaging: Three-view x-ray of bilateral knees shows no fracture or acute bony pathology.  Well-maintained joint space in the medial lateral compartment in both knees.  No osteophyte formation noted on AP view.  Patellar sunrise view there is noted to be decreased joint space particular over the lateral facet.  Right is greater then left.   Osteophyte formation over the lateral aspect of the patella.     A/P: Patient is a 47-year-old female presenting with right greater than left anterior medial medial sided knee pain.  On x-ray there is moderate patellofemoral osteoarthritis.  Also she is also having some medial joint line pain and mechanical symptoms on the medial aspect of the knee.  We had a long discussion with her today about the prognosis of moderate arthritis with possible medial meniscus tear as well as the operative and nonoperative treatment options.  We discussed with her corticosteroid injection as well as the possibility of arthroscopy with partial meniscectomy and chondroplasty.  We are going to move forward with getting MRI of the right knee.  She would like to hold off on any intra-articular injection until the MRI is performed.  We are all in agreement with this plan.  We had the opportunity answer questions at this time.  We will order an MRI without contrast of the right knee and will call her for a telehealth visit to go over the results and make a definitive treatment plan moving forward once that MRI has been performed.    Hebert Lozano MD

## 2022-10-12 NOTE — LETTER
10/12/2022         RE: Trinity Chow  [address]  [facility] [address]        Dear Colleague,    Thank you for referring your patient, Trinity Chow, to the [facility] [place]. Please see a copy of my visit note below.    Patient seen and examined with the fellow. I also personally reviewed the images and interpreted the imaging myself.     Assesment: right knee, differential diagnosis includes patellofemoral arthritis vs. Medial meniscus tear    Plan: MRI of knee, f/u after mri    I agree with history, physical and imaging as well as the assessment and plan as detailed by Dr. Lozano.       CC: Right greater than left knee pain    HPI: Patient is a 47-year-old female who presents us today with right greater than left knee pain.  Does not recall any specific injury or traumatic cause.  She said she began noticing this pain in late August.  She thinks it correlates with a hiking trip.  She is now having pain in the anterior medial aspect of her knee.  She also noticed some pain and clicking on the medial aspect of her knee.  Is worse with ascending and descending stairs, sitting on the floor, and at night.  She is tried p.o. NSAIDs and topicals which have helped but not eliminate her pain.  She has not undergone physical therapy or any intra-articular injection for this.  She denies any previous injury or surgery to either leg.    PMH: None    PSH:     All: none    Meds: None    SH: She is a hospitalist physician at a local hospital.  She does not smoke.    Act: She enjoys walking and hiking    ROS: A complete review of systems was performed and was otherwise negative for General, HEENT, CV, pulm, GI, , neuro, endocrine, heme, lymph, musculoskeletal, or psych.    O:  PE:  RLE: Free and painless range of motion of the hip.  Mild joint effusion.  0 to 130 degrees of passive motion.  Pain at the extremes of motion.  20 degrees shy of full extension  due to pain.  Positive patellar grind for pain.  Mild pain to palpation over the medial joint line.  No specific pain to palpation over the lateral joint line.  Ia Lachman.  Stable anterior posterior drawer.  Stable to varus and valgus stress at 0 and 30 degrees of knee flexion.  5/5 knee flexion extension.    Imaging: Three-view x-ray of bilateral knees shows no fracture or acute bony pathology.  Well-maintained joint space in the medial lateral compartment in both knees.  No osteophyte formation noted on AP view.  Patellar sunrise view there is noted to be decreased joint space particular over the lateral facet.  Right is greater then left.  Osteophyte formation over the lateral aspect of the patella.     A/P: Patient is a 47-year-old female presenting with right greater than left anterior medial medial sided knee pain.  On x-ray there is moderate patellofemoral osteoarthritis.  Also she is also having some medial joint line pain and mechanical symptoms on the medial aspect of the knee.  We had a long discussion with her today about the prognosis of moderate arthritis with possible medial meniscus tear as well as the operative and nonoperative treatment options.  We discussed with her corticosteroid injection as well as the possibility of arthroscopy with partial meniscectomy and chondroplasty.  We are going to move forward with getting MRI of the right knee.  She would like to hold off on any intra-articular injection until the MRI is performed.  We are all in agreement with this plan.  We had the opportunity answer questions at this time.  We will order an MRI without contrast of the right knee and will call her for a telehealth visit to go over the results and make a definitive treatment plan moving forward once that MRI has been performed.    MD Solomon Pedraza MD

## 2022-10-12 NOTE — PROGRESS NOTES
Patient seen and examined with the fellow. I also personally reviewed the images and interpreted the imaging myself.     Assesment: right knee, differential diagnosis includes patellofemoral arthritis vs. Medial meniscus tear    Plan: MRI of knee, f/u after mri    I agree with history, physical and imaging as well as the assessment and plan as detailed by Dr. Lozano.

## 2022-10-17 ENCOUNTER — VIRTUAL VISIT (OUTPATIENT)
Dept: ORTHOPEDICS | Facility: CLINIC | Age: 47
End: 2022-10-17
Payer: COMMERCIAL

## 2022-10-17 ENCOUNTER — HOSPITAL ENCOUNTER (OUTPATIENT)
Dept: RESPIRATORY THERAPY | Facility: CLINIC | Age: 47
Discharge: HOME | End: 2022-10-17
Attending: INTERNAL MEDICINE | Admitting: INTERNAL MEDICINE
Payer: COMMERCIAL

## 2022-10-17 DIAGNOSIS — G89.29 CHRONIC PAIN OF RIGHT KNEE: Primary | ICD-10-CM

## 2022-10-17 DIAGNOSIS — R06.09 EXERTIONAL DYSPNEA: ICD-10-CM

## 2022-10-17 DIAGNOSIS — M25.561 CHRONIC PAIN OF RIGHT KNEE: Primary | ICD-10-CM

## 2022-10-17 PROCEDURE — 94729 DIFFUSING CAPACITY: CPT

## 2022-10-17 PROCEDURE — 999N000157 HC STATISTIC RCP TIME EA 10 MIN

## 2022-10-17 PROCEDURE — 99213 OFFICE O/P EST LOW 20 MIN: CPT | Mod: 95 | Performed by: ORTHOPAEDIC SURGERY

## 2022-10-17 PROCEDURE — 94010 BREATHING CAPACITY TEST: CPT

## 2022-10-17 PROCEDURE — 94726 PLETHYSMOGRAPHY LUNG VOLUMES: CPT

## 2022-10-17 NOTE — PROGRESS NOTES
Trinity is a 47 year old who is being evaluated via a billable telephone visit.      Need to review the MRI results with the patient over the telephone today for her right knee MRI.  She denies any intercurrent trauma.    No examination was completed today as this was a telephone visit    MRI does show high-grade cartilage loss of the patellofemoral compartment.  This is particularly true on the patella.The trochlear side is a little bit better well-preserved with the exception of the most proximal aspect of the trochlea.  There is grade 4 change the lateral patella facet extending up onto the central ridge.  Significant effusion is present.  Overall on my review I think the tibiofemoral compartments are largely well-preserved.  No definite tears of the meniscus on my review.  Cruciate and collateral ligaments are intact.      Clinical assessment: Patellar base chondrosis/arthritis with large symptomatic effusion    Plan: Long discussion with the patient.  I reviewed with her her diagnosis.  I told her that it would be my recommendation that we consider a corticosteroid injection.  I think this could help with her large symptomatic effusion and improve the symptoms of her patellar arthritis.    We can arrange a clinic visit when she could come in and have this done.    She does have some pain about her left knee as well as her elbow and she is previously been evaluated for a potential rheumatologic cause a quick scan of these lab values by myself has not demonstrated any evidence of a rheumatologic cause and I think we should just treat her for osteoarthritis at this time.  She was also checked for Lyme's disease so I do not think this is it either.    If she fails to see lasting improvement I think we ultimately could consider arthroscopic evaluation however would like to exhaust nonsurgical things first I think trying a corticosteroid injection is a reasonable way to proceed.        What phone number would you like  to be contacted at? [phone]  How would you like to obtain your AVS? [employer]  Phone call duration: 10 minutes

## 2022-10-17 NOTE — LETTER
10/17/2022         RE: Trinity Chow  [address]  [facility] [address]        Dear Colleague,    Thank you for referring your patient, Trinity Chow, to the [facility] [place]. Please see a copy of my visit note below.    Trinity is a 47 year old who is being evaluated via a billable telephone visit.      Need to review the MRI results with the patient over the telephone today for her right knee MRI.  She denies any intercurrent trauma.    No examination was completed today as this was a telephone visit    MRI does show high-grade cartilage loss of the patellofemoral compartment.  This is particularly true on the patella.The trochlear side is a little bit better well-preserved with the exception of the most proximal aspect of the trochlea.  There is grade 4 change the lateral patella facet extending up onto the central ridge.  Significant effusion is present.  Overall on my review I think the tibiofemoral compartments are largely well-preserved.  No definite tears of the meniscus on my review.  Cruciate and collateral ligaments are intact.      Clinical assessment: Patellar base chondrosis/arthritis with large symptomatic effusion    Plan: Long discussion with the patient.  I reviewed with her her diagnosis.  I told her that it would be my recommendation that we consider a corticosteroid injection.  I think this could help with her large symptomatic effusion and improve the symptoms of her patellar arthritis.    We can arrange a clinic visit when she could come in and have this done.    She does have some pain about her left knee as well as her elbow and she is previously been evaluated for a potential rheumatologic cause a quick scan of these lab values by myself has not demonstrated any evidence of a rheumatologic cause and I think we should just treat her for osteoarthritis at this time.  She was also checked for Lyme's disease so I do not think this is  it either.    If she fails to see lasting improvement I think we ultimately could consider arthroscopic evaluation however would like to exhaust nonsurgical things first I think trying a corticosteroid injection is a reasonable way to proceed.        What phone number would you like to be contacted at? [phone]  How would you like to obtain your AVS? Katherine  Phone call duration: 10 minutes    Solomon Simeon MD

## 2022-10-17 NOTE — PROGRESS NOTES
PFT Note:        Pt completed pulmonary function testing with DLCO.  Good Pt effort and cooperation.  All testing meets ATS recommendations.  DLCO is an average of 2 maneuvers.  Predicted DLCO is corrected for a Hgb of 13.2 drawn on 9/19/2022.     October 17, 2022.8:07 AM  Elpidio Boothe RT

## 2022-10-19 ENCOUNTER — OFFICE VISIT (OUTPATIENT)
Dept: ORTHOPEDICS | Facility: CLINIC | Age: 47
End: 2022-10-19
Payer: COMMERCIAL

## 2022-10-19 VITALS — HEIGHT: 66 IN | WEIGHT: 161 LBS | BODY MASS INDEX: 25.88 KG/M2

## 2022-10-19 DIAGNOSIS — M25.562 ACUTE PAIN OF BOTH KNEES: ICD-10-CM

## 2022-10-19 DIAGNOSIS — G89.29 CHRONIC PAIN OF RIGHT KNEE: Primary | ICD-10-CM

## 2022-10-19 DIAGNOSIS — M25.561 CHRONIC PAIN OF RIGHT KNEE: Primary | ICD-10-CM

## 2022-10-19 DIAGNOSIS — M25.561 ACUTE PAIN OF BOTH KNEES: ICD-10-CM

## 2022-10-19 LAB
CRYSTALS SNV MICRO: NORMAL
DLCOCOR-%PRED-PRE: 84 %
DLCOCOR-PRE: 19.16 ML/MIN/MMHG
DLCOUNC-%PRED-PRE: 84 %
DLCOUNC-PRE: 19.05 ML/MIN/MMHG
DLCOUNC-PRED: 22.6 ML/MIN/MMHG
ERV-%PRED-PRE: 96 %
ERV-PRE: 0.94 L
ERV-PRED: 0.97 L
EXPTIME-PRE: 9.2 SEC
FEF2575-%PRED-PRE: 92 %
FEF2575-PRE: 2.54 L/SEC
FEF2575-PRED: 2.75 L/SEC
FEFMAX-%PRED-PRE: 105 %
FEFMAX-PRE: 7.45 L/SEC
FEFMAX-PRED: 7.08 L/SEC
FEV1-%PRED-PRE: 96 %
FEV1-PRE: 2.69 L
FEV1FEV6-PRE: 81 %
FEV1FEV6-PRED: 82 %
FEV1FVC-PRE: 80 %
FEV1FVC-PRED: 81 %
FEV1SVC-PRE: 75 %
FEV1SVC-PRED: 74 %
FIFMAX-PRE: 2.48 L/SEC
FRCPLETH-%PRED-PRE: 97 %
FRCPLETH-PRE: 2.74 L
FRCPLETH-PRED: 2.8 L
FVC-%PRED-PRE: 98 %
FVC-PRE: 3.36 L
FVC-PRED: 3.43 L
IC-%PRED-PRE: 94 %
IC-PRE: 2.63 L
IC-PRED: 2.78 L
RVPLETH-%PRED-PRE: 99 %
RVPLETH-PRE: 1.8 L
RVPLETH-PRED: 1.82 L
TLCPLETH-%PRED-PRE: 101 %
TLCPLETH-PRE: 5.37 L
TLCPLETH-PRED: 5.27 L
VA-%PRED-PRE: 89 %
VA-PRE: 4.76 L
VC-%PRED-PRE: 94 %
VC-PRE: 3.57 L
VC-PRED: 3.76 L

## 2022-10-19 PROCEDURE — 99000 SPECIMEN HANDLING OFFICE-LAB: CPT | Performed by: PATHOLOGY

## 2022-10-19 PROCEDURE — 89060 EXAM SYNOVIAL FLUID CRYSTALS: CPT | Mod: 90 | Performed by: PATHOLOGY

## 2022-10-19 PROCEDURE — 20610 DRAIN/INJ JOINT/BURSA W/O US: CPT | Mod: 50 | Performed by: ORTHOPAEDIC SURGERY

## 2022-10-19 RX ORDER — LIDOCAINE HYDROCHLORIDE 5 MG/ML
8 INJECTION, SOLUTION INFILTRATION; INTRAVENOUS
Status: SHIPPED | OUTPATIENT
Start: 2022-10-19

## 2022-10-19 RX ORDER — TRIAMCINOLONE ACETONIDE 40 MG/ML
40 INJECTION, SUSPENSION INTRA-ARTICULAR; INTRAMUSCULAR
Status: SHIPPED | OUTPATIENT
Start: 2022-10-19

## 2022-10-19 RX ADMIN — LIDOCAINE HYDROCHLORIDE 8 ML: 5 INJECTION, SOLUTION INFILTRATION; INTRAVENOUS at 09:25

## 2022-10-19 RX ADMIN — TRIAMCINOLONE ACETONIDE 40 MG: 40 INJECTION, SUSPENSION INTRA-ARTICULAR; INTRAMUSCULAR at 09:25

## 2022-10-19 NOTE — NURSING NOTE
[address] [address]  Dept: [phone]  ______________________________________________________________________________    Patient: Trinity Chow   : 1975   MRN: [id number]   2022    INVASIVE PROCEDURE SAFETY CHECKLIST    Date: 10/19/22   Procedure:Bilateral knee kenalog injection; right knee aspiration  Patient Name: Trinity Chow  MRN: [id number]  YOB: 1975    Action: Complete sections as appropriate. Any discrepancy results in a HARD COPY until resolved.     PRE PROCEDURE:  Patient ID verified with 2 identifiers (name and  or MRN): Yes  Procedure and site verified with patient/designee (when able): Yes  Accurate consent documentation in medical record: Yes  H&P (or appropriate assessment) documented in medical record: Yes  H&P must be up to 20 days prior to procedure and updates within 24 hours of procedure as applicable: NA  Relevant diagnostic and radiology test results appropriately labeled and displayed as applicable: Yes  Procedure site(s) marked with provider initials: NA    TIMEOUT:  Time-Out performed immediately prior to starting procedure, including verbal and active participation of all team members addressing the following:Yes  * Correct patient identify  * Confirmed that the correct side and site are marked  * An accurate procedure consent form  * Agreement on the procedure to be done  * Correct patient position  * Relevant images and results are properly labeled and appropriately displayed  * The need to administer antibiotics or fluids for irrigation purposes during the procedure as applicable   * Safety precautions based on patient history or medication use    DURING PROCEDURE: Verification of correct person, site, and procedures any time the responsibility for care of the patient is transferred to another member of the care team.       Prior to injection, verified patient identity using  patient's name and date of birth.  Due to injection administration, patient instructed to remain in clinic for 15 minutes  afterwards, and to report any adverse reaction to me immediately.    Joint injection was performed.   and right knee aspiration    Drug Amount Wasted:  Yes: 34 mg/ml  lidocaine  Vial/Syringe: Single dose vial  Expiration Date:  02/2026      Ora Knight, MIO  October 19, 2022

## 2022-10-19 NOTE — LETTER
10/19/2022         RE: Trinity Chow  [address]  [facility] [address]        Dear Colleague,    Thank you for referring your patient, Trinity Chow, to the [facility] [place]. Please see a copy of my visit note below.      Trinity Chow  returns to my clinic today for interval follow-up she is a very pleasant 47-year-old female who presents to see me today for evaluation of her knee.  We talked earlier this week and ordered an MRI of her knee.  She now comes to my clinic today to complete the injections that we discussed.    Right knee today shows symptomatic effusion, preserved motion.  Ligaments stable.  Neurovascular intact.  Left knee today shows small effusion.  Stable ligaments.  Neurovascular intact.  Motion is preserved.    MRI of the right knee was reviewed which does show high-grade cartilage loss the patellofemoral compartment and no compelling or definite meniscus tears    Clinical assessment: Presumed patella femoral arthritis right knee with symptomatic effusion, likely patellofemoral arthritis left knee    Plan: Long discussion today with the patient.  I reviewed the diagnosis.  We have elected to proceed with the following course of action: Aspiration right knee and corticosteroid injection; injection left knee of corticosteroid.    After informed consent was obtained under sterile technique 40 mg of Kenalog were injected without complication to the right knee after aspiration of 30 cc of clear synovial fluid.  I did elect to send this for crystal analysis after discussion with the patient as this has not been assessed in the past.    Then under sterile technique 40 mg of Kenalog were injected the left knee without complication.  Patient tolerated this well.    Large Joint Injection/Arthocentesis: bilateral knee    Date/Time: 10/19/2022 9:25 AM  Performed by: Solomon Simeon MD  Authorized by: Solomon Simeon,  MD     Indications:  Pain  Needle Size:  22 G  Needle Size comment:  18g for aspiration    Guidance: landmark guided    Approach:  Lateral  Location:  Knee  Laterality:  Bilateral      Medications (Right):  40 mg triamcinolone 40 MG/ML; 8 mL lidocaine (PF) 0.5 %  Aspirate amount (mL):  18  Aspirate:  Yellow and clear  Aspirate analysis: sent for lab analysis    Medications (Left):  40 mg triamcinolone 40 MG/ML; 8 mL lidocaine (PF) 0.5 %  Outcome:  Tolerated well, no immediate complications  Procedure discussed: discussed risks, benefits, and alternatives    Consent Given by:  Patient  Timeout: timeout called immediately prior to procedure    Prep: patient was prepped and draped in usual sterile fashion          Solomon Simeon MD

## 2022-10-19 NOTE — PROGRESS NOTES
Trinity Chow  returns to my clinic today for interval follow-up she is a very pleasant 47-year-old female who presents to see me today for evaluation of her knee.  We talked earlier this week and ordered an MRI of her knee.  She now comes to my clinic today to complete the injections that we discussed.    Right knee today shows symptomatic effusion, preserved motion.  Ligaments stable.  Neurovascular intact.  Left knee today shows small effusion.  Stable ligaments.  Neurovascular intact.  Motion is preserved.    MRI of the right knee was reviewed which does show high-grade cartilage loss the patellofemoral compartment and no compelling or definite meniscus tears    Clinical assessment: Presumed patella femoral arthritis right knee with symptomatic effusion, likely patellofemoral arthritis left knee    Plan: Long discussion today with the patient.  I reviewed the diagnosis.  We have elected to proceed with the following course of action: Aspiration right knee and corticosteroid injection; injection left knee of corticosteroid.    After informed consent was obtained under sterile technique 40 mg of Kenalog were injected without complication to the right knee after aspiration of 30 cc of clear synovial fluid.  I did elect to send this for crystal analysis after discussion with the patient as this has not been assessed in the past.    Then under sterile technique 40 mg of Kenalog were injected the left knee without complication.  Patient tolerated this well.    Large Joint Injection/Arthocentesis: bilateral knee    Date/Time: 10/19/2022 9:25 AM  Performed by: Solomon Simeon MD  Authorized by: Solomon Simeon MD     Indications:  Pain  Needle Size:  22 G  Needle Size comment:  18g for aspiration    Guidance: landmark guided    Approach:  Lateral  Location:  Knee  Laterality:  Bilateral      Medications (Right):  40 mg triamcinolone 40 MG/ML; 8 mL lidocaine (PF) 0.5 %  Aspirate amount  (mL):  18  Aspirate:  Yellow and clear  Aspirate analysis: sent for lab analysis    Medications (Left):  40 mg triamcinolone 40 MG/ML; 8 mL lidocaine (PF) 0.5 %  Outcome:  Tolerated well, no immediate complications  Procedure discussed: discussed risks, benefits, and alternatives    Consent Given by:  Patient  Timeout: timeout called immediately prior to procedure    Prep: patient was prepped and draped in usual sterile fashion

## 2022-10-21 ENCOUNTER — TELEPHONE (OUTPATIENT)
Dept: ORTHOPEDICS | Facility: CLINIC | Age: 47
End: 2022-10-21

## 2022-10-21 NOTE — RESULT ENCOUNTER NOTE
I had a chance to review the laboratory study on Trinity Chow  Can you please call the patient and explain the following:  Aspiration showed no evidence of crystalline disease      Please document that you contact the patient, ok to offer a followup visit to discuss with me if the patient wants.

## 2022-10-21 NOTE — TELEPHONE ENCOUNTER
Called patient at the request of Dr. Simeon to discuss her that her lab results were in from her aspiration on 10/19. She was informed that no crystals were found. She was grateful for the information. She does report that she's still experiencing some pain and swelling in the knee but understands that it can take a week or so to notice relief from the injection. She was advised to give it a week or so and to call our clinic if she doesn't see any improvement in that timeframe. She verbalized understanding.     She also states that she would like to do physical therapy at [facility] in [place]. Her order was faxed to their clinic. She had no further questions at this time.

## 2022-11-16 LAB — NONINV COLON CA DNA+OCC BLD SCRN STL QL: NEGATIVE

## 2022-11-19 ENCOUNTER — HEALTH MAINTENANCE LETTER (OUTPATIENT)
Age: 47
End: 2022-11-19

## 2022-12-07 ENCOUNTER — TRANSFERRED RECORDS (OUTPATIENT)
Dept: HEALTH INFORMATION MANAGEMENT | Facility: CLINIC | Age: 47
End: 2022-12-07

## 2022-12-07 DIAGNOSIS — M25.50 MULTIPLE JOINT PAIN: Primary | ICD-10-CM

## 2022-12-12 ENCOUNTER — TRANSFERRED RECORDS (OUTPATIENT)
Dept: INTERNAL MEDICINE | Facility: CLINIC | Age: 47
End: 2022-12-12

## 2022-12-14 ENCOUNTER — LAB (OUTPATIENT)
Dept: LAB | Facility: CLINIC | Age: 47
End: 2022-12-14
Payer: COMMERCIAL

## 2022-12-14 DIAGNOSIS — M25.50 MULTIPLE JOINT PAIN: ICD-10-CM

## 2022-12-14 DIAGNOSIS — Z11.4 SCREENING FOR HIV (HUMAN IMMUNODEFICIENCY VIRUS): Primary | ICD-10-CM

## 2022-12-14 LAB
CRP SERPL-MCNC: 9.05 MG/L
ERYTHROCYTE [SEDIMENTATION RATE] IN BLOOD BY WESTERGREN METHOD: 18 MM/HR (ref 0–20)

## 2022-12-14 PROCEDURE — 86200 CCP ANTIBODY: CPT

## 2022-12-14 PROCEDURE — 86039 ANTINUCLEAR ANTIBODIES (ANA): CPT

## 2022-12-14 PROCEDURE — 85652 RBC SED RATE AUTOMATED: CPT

## 2022-12-14 PROCEDURE — 36415 COLL VENOUS BLD VENIPUNCTURE: CPT

## 2022-12-14 PROCEDURE — 86038 ANTINUCLEAR ANTIBODIES: CPT

## 2022-12-14 PROCEDURE — 87389 HIV-1 AG W/HIV-1&-2 AB AG IA: CPT

## 2022-12-14 PROCEDURE — 86431 RHEUMATOID FACTOR QUANT: CPT

## 2022-12-14 PROCEDURE — 86140 C-REACTIVE PROTEIN: CPT

## 2022-12-15 LAB
ANA PAT SER IF-IMP: ABNORMAL
ANA SER QL IF: ABNORMAL
ANA TITR SER IF: ABNORMAL {TITER}
CCP AB SER IA-ACNC: 0.9 U/ML
HIV 1+2 AB+HIV1 P24 AG SERPL QL IA: NONREACTIVE
RHEUMATOID FACT SER NEPH-ACNC: <6 IU/ML

## 2022-12-20 ENCOUNTER — LAB (OUTPATIENT)
Dept: LAB | Facility: CLINIC | Age: 47
End: 2022-12-20
Payer: COMMERCIAL

## 2022-12-20 DIAGNOSIS — M25.50 MULTIPLE JOINT PAIN: ICD-10-CM

## 2022-12-20 DIAGNOSIS — M25.50 MULTIPLE JOINT PAIN: Primary | ICD-10-CM

## 2022-12-20 PROCEDURE — 86618 LYME DISEASE ANTIBODY: CPT

## 2022-12-20 PROCEDURE — 36415 COLL VENOUS BLD VENIPUNCTURE: CPT

## 2022-12-21 LAB — B BURGDOR IGG+IGM SER QL: 0.03

## 2023-01-03 DIAGNOSIS — M25.40 SWELLING OF MULTIPLE JOINTS: Primary | ICD-10-CM

## 2023-01-03 RX ORDER — PREDNISONE 10 MG/1
20 TABLET ORAL DAILY
Qty: 60 TABLET | Refills: 1 | Status: SHIPPED | OUTPATIENT
Start: 2023-01-03 | End: 2023-01-18

## 2023-01-04 ENCOUNTER — TRANSFERRED RECORDS (OUTPATIENT)
Dept: HEALTH INFORMATION MANAGEMENT | Facility: CLINIC | Age: 48
End: 2023-01-04

## 2023-01-04 ENCOUNTER — MEDICAL CORRESPONDENCE (OUTPATIENT)
Dept: INTERNAL MEDICINE | Facility: CLINIC | Age: 48
End: 2023-01-04

## 2023-01-04 DIAGNOSIS — M25.431 SWELLING OF BOTH WRISTS: ICD-10-CM

## 2023-01-04 DIAGNOSIS — M79.89 SWELLING OF BOTH HANDS: ICD-10-CM

## 2023-01-04 DIAGNOSIS — M25.472 LEFT ANKLE SWELLING: ICD-10-CM

## 2023-01-04 DIAGNOSIS — M79.89 BILATERAL SWELLING OF FEET: ICD-10-CM

## 2023-01-04 DIAGNOSIS — M25.531 PAIN IN BOTH WRISTS: ICD-10-CM

## 2023-01-04 DIAGNOSIS — M25.432 SWELLING OF BOTH WRISTS: ICD-10-CM

## 2023-01-04 DIAGNOSIS — M25.572 PAIN IN JOINT INVOLVING ANKLE AND FOOT, LEFT: ICD-10-CM

## 2023-01-04 DIAGNOSIS — M25.471 RIGHT ANKLE SWELLING: ICD-10-CM

## 2023-01-04 DIAGNOSIS — M25.532 PAIN IN BOTH WRISTS: ICD-10-CM

## 2023-01-04 DIAGNOSIS — M06.4 INFLAMMATORY POLYARTHROPATHY (H): Primary | ICD-10-CM

## 2023-01-04 DIAGNOSIS — M25.571 PAIN IN JOINT INVOLVING ANKLE AND FOOT, RIGHT: ICD-10-CM

## 2023-01-04 DIAGNOSIS — M79.642 PAIN IN BOTH HANDS: ICD-10-CM

## 2023-01-04 DIAGNOSIS — M79.641 PAIN IN BOTH HANDS: ICD-10-CM

## 2023-01-05 ENCOUNTER — HOSPITAL ENCOUNTER (OUTPATIENT)
Dept: GENERAL RADIOLOGY | Facility: CLINIC | Age: 48
Discharge: HOME | End: 2023-01-05
Attending: INTERNAL MEDICINE
Payer: COMMERCIAL

## 2023-01-05 ENCOUNTER — LAB (OUTPATIENT)
Dept: LAB | Facility: CLINIC | Age: 48
End: 2023-01-05
Payer: COMMERCIAL

## 2023-01-05 DIAGNOSIS — M79.641 PAIN IN BOTH HANDS: ICD-10-CM

## 2023-01-05 DIAGNOSIS — M06.4 INFLAMMATORY POLYARTHROPATHY (H): ICD-10-CM

## 2023-01-05 DIAGNOSIS — M25.571 PAIN IN JOINT INVOLVING ANKLE AND FOOT, RIGHT: ICD-10-CM

## 2023-01-05 DIAGNOSIS — M25.531 PAIN IN BOTH WRISTS: ICD-10-CM

## 2023-01-05 DIAGNOSIS — M25.472 LEFT ANKLE SWELLING: ICD-10-CM

## 2023-01-05 DIAGNOSIS — M79.89 SWELLING OF BOTH HANDS: ICD-10-CM

## 2023-01-05 DIAGNOSIS — M25.572 PAIN IN JOINT INVOLVING ANKLE AND FOOT, LEFT: ICD-10-CM

## 2023-01-05 DIAGNOSIS — M25.532 PAIN IN BOTH WRISTS: ICD-10-CM

## 2023-01-05 DIAGNOSIS — M79.89 BILATERAL SWELLING OF FEET: ICD-10-CM

## 2023-01-05 DIAGNOSIS — M79.642 PAIN IN BOTH HANDS: ICD-10-CM

## 2023-01-05 DIAGNOSIS — M25.471 RIGHT ANKLE SWELLING: ICD-10-CM

## 2023-01-05 DIAGNOSIS — M25.432 SWELLING OF BOTH WRISTS: ICD-10-CM

## 2023-01-05 DIAGNOSIS — M25.40 SWELLING OF MULTIPLE JOINTS: ICD-10-CM

## 2023-01-05 DIAGNOSIS — M25.431 SWELLING OF BOTH WRISTS: ICD-10-CM

## 2023-01-05 LAB
ALBUMIN UR-MCNC: NEGATIVE MG/DL
APPEARANCE UR: CLEAR
BACTERIA #/AREA URNS HPF: ABNORMAL /HPF
BILIRUB UR QL STRIP: NEGATIVE
COLOR UR AUTO: YELLOW
CREAT UR-MCNC: 184 MG/DL
CRP SERPL-MCNC: 31.6 MG/L
ERYTHROCYTE [SEDIMENTATION RATE] IN BLOOD BY WESTERGREN METHOD: 36 MM/HR (ref 0–20)
GLUCOSE UR STRIP-MCNC: NEGATIVE MG/DL
HGB UR QL STRIP: NEGATIVE
KETONES UR STRIP-MCNC: NEGATIVE MG/DL
LEUKOCYTE ESTERASE UR QL STRIP: ABNORMAL
MICROALBUMIN UR-MCNC: <12 MG/L
MICROALBUMIN/CREAT UR: NORMAL MG/G{CREAT}
MUCOUS THREADS #/AREA URNS LPF: PRESENT /LPF
NITRATE UR QL: NEGATIVE
PH UR STRIP: 5.5 [PH] (ref 5–7)
RBC #/AREA URNS AUTO: ABNORMAL /HPF
SP GR UR STRIP: >=1.03 (ref 1–1.03)
SQUAMOUS #/AREA URNS AUTO: ABNORMAL /LPF
UROBILINOGEN UR STRIP-ACNC: 0.2 E.U./DL
WBC #/AREA URNS AUTO: ABNORMAL /HPF

## 2023-01-05 PROCEDURE — 73630 X-RAY EXAM OF FOOT: CPT | Mod: 50

## 2023-01-05 PROCEDURE — 86160 COMPLEMENT ANTIGEN: CPT | Performed by: INTERNAL MEDICINE

## 2023-01-05 PROCEDURE — 86235 NUCLEAR ANTIGEN ANTIBODY: CPT | Mod: 90 | Performed by: INTERNAL MEDICINE

## 2023-01-05 PROCEDURE — 86747 PARVOVIRUS ANTIBODY: CPT | Mod: 90 | Performed by: INTERNAL MEDICINE

## 2023-01-05 PROCEDURE — 85652 RBC SED RATE AUTOMATED: CPT | Performed by: INTERNAL MEDICINE

## 2023-01-05 PROCEDURE — 86200 CCP ANTIBODY: CPT

## 2023-01-05 PROCEDURE — 86803 HEPATITIS C AB TEST: CPT | Performed by: INTERNAL MEDICINE

## 2023-01-05 PROCEDURE — 86225 DNA ANTIBODY NATIVE: CPT

## 2023-01-05 PROCEDURE — 86481 TB AG RESPONSE T-CELL SUSP: CPT | Performed by: INTERNAL MEDICINE

## 2023-01-05 PROCEDURE — 82570 ASSAY OF URINE CREATININE: CPT | Performed by: INTERNAL MEDICINE

## 2023-01-05 PROCEDURE — 73610 X-RAY EXAM OF ANKLE: CPT | Mod: 50

## 2023-01-05 PROCEDURE — 83516 IMMUNOASSAY NONANTIBODY: CPT | Mod: 90 | Performed by: INTERNAL MEDICINE

## 2023-01-05 PROCEDURE — 87340 HEPATITIS B SURFACE AG IA: CPT | Performed by: INTERNAL MEDICINE

## 2023-01-05 PROCEDURE — 81001 URINALYSIS AUTO W/SCOPE: CPT | Performed by: INTERNAL MEDICINE

## 2023-01-05 PROCEDURE — 86036 ANCA SCREEN EACH ANTIBODY: CPT | Performed by: INTERNAL MEDICINE

## 2023-01-05 PROCEDURE — 83876 ASSAY MYELOPEROXIDASE: CPT | Performed by: INTERNAL MEDICINE

## 2023-01-05 PROCEDURE — 82043 UR ALBUMIN QUANTITATIVE: CPT | Performed by: INTERNAL MEDICINE

## 2023-01-05 PROCEDURE — 86706 HEP B SURFACE ANTIBODY: CPT | Performed by: INTERNAL MEDICINE

## 2023-01-05 PROCEDURE — 83516 IMMUNOASSAY NONANTIBODY: CPT | Performed by: INTERNAL MEDICINE

## 2023-01-05 PROCEDURE — 99000 SPECIMEN HANDLING OFFICE-LAB: CPT | Performed by: INTERNAL MEDICINE

## 2023-01-05 PROCEDURE — 36415 COLL VENOUS BLD VENIPUNCTURE: CPT | Performed by: INTERNAL MEDICINE

## 2023-01-05 PROCEDURE — 86235 NUCLEAR ANTIGEN ANTIBODY: CPT | Performed by: INTERNAL MEDICINE

## 2023-01-05 PROCEDURE — 86039 ANTINUCLEAR ANTIBODIES (ANA): CPT | Performed by: INTERNAL MEDICINE

## 2023-01-05 PROCEDURE — 86431 RHEUMATOID FACTOR QUANT: CPT | Performed by: INTERNAL MEDICINE

## 2023-01-05 PROCEDURE — 73130 X-RAY EXAM OF HAND: CPT | Mod: 50

## 2023-01-05 PROCEDURE — 86235 NUCLEAR ANTIGEN ANTIBODY: CPT | Mod: 59

## 2023-01-05 PROCEDURE — 86235 NUCLEAR ANTIGEN ANTIBODY: CPT

## 2023-01-05 PROCEDURE — 86256 FLUORESCENT ANTIBODY TITER: CPT | Performed by: INTERNAL MEDICINE

## 2023-01-05 PROCEDURE — 81374 HLA I TYPING 1 ANTIGEN LR: CPT

## 2023-01-05 PROCEDURE — 86140 C-REACTIVE PROTEIN: CPT | Performed by: INTERNAL MEDICINE

## 2023-01-05 PROCEDURE — 86038 ANTINUCLEAR ANTIBODIES: CPT | Performed by: INTERNAL MEDICINE

## 2023-01-05 PROCEDURE — 73110 X-RAY EXAM OF WRIST: CPT | Mod: 50

## 2023-01-06 LAB
ANA PAT SER IF-IMP: ABNORMAL
ANA SER QL IF: ABNORMAL
ANA TITR SER IF: ABNORMAL {TITER}
ANCA AB PATTERN SER IF-IMP: NORMAL
C-ANCA TITR SER IF: NORMAL {TITER}
C3 SERPL-MCNC: 145 MG/DL (ref 81–157)
C4 SERPL-MCNC: 44 MG/DL (ref 13–39)
CENTROMERE B AB SER-ACNC: <0.7 U/ML
CENTROMERE B AB SER-ACNC: NEGATIVE
ENA SCL70 IGG SER IA-ACNC: <0.6 U/ML
ENA SCL70 IGG SER IA-ACNC: NEGATIVE
ENA SM IGG SER IA-ACNC: <0.7 U/ML
ENA SM IGG SER IA-ACNC: NEGATIVE
HBV SURFACE AB SERPL IA-ACNC: >1000 M[IU]/ML
HBV SURFACE AB SERPL IA-ACNC: REACTIVE M[IU]/ML
HBV SURFACE AG SERPL QL IA: NONREACTIVE
HCV AB SERPL QL IA: NONREACTIVE
MYELOPEROXIDASE AB SER IA-ACNC: <0.3 U/ML
MYELOPEROXIDASE AB SER IA-ACNC: NEGATIVE
PROTEINASE3 AB SER IA-ACNC: <1 U/ML
PROTEINASE3 AB SER IA-ACNC: NEGATIVE
QUANTIFERON MITOGEN: 2.56 IU/ML
QUANTIFERON NIL TUBE: 0.07 IU/ML
QUANTIFERON TB1 TUBE: 0.07 IU/ML
QUANTIFERON TB2 TUBE: 0.05
RHEUMATOID FACT SER NEPH-ACNC: 8 IU/ML
U1 SNRNP IGG SER IA-ACNC: 2.1 U/ML
U1 SNRNP IGG SER IA-ACNC: NEGATIVE

## 2023-01-08 LAB
B19V IGG SER IA-ACNC: 5.62 IV
B19V IGM SER IA-ACNC: 0.26 IV
GAMMA INTERFERON BACKGROUND BLD IA-ACNC: 0.07 IU/ML
M TB IFN-G BLD-IMP: NEGATIVE
M TB IFN-G CD4+ BCKGRND COR BLD-ACNC: 2.49 IU/ML
MITOGEN IGNF BCKGRD COR BLD-ACNC: -0.02 IU/ML
MITOGEN IGNF BCKGRD COR BLD-ACNC: 0 IU/ML
RNAP III IGG SERPL IA-ACNC: 3 UNITS

## 2023-01-11 ENCOUNTER — MYC MEDICAL ADVICE (OUTPATIENT)
Dept: INTERNAL MEDICINE | Facility: CLINIC | Age: 48
End: 2023-01-11

## 2023-01-11 LAB
B LOCUS: NORMAL
B27TEST METHOD: NORMAL
CCP AB SER IA-ACNC: 0.5 U/ML
DSDNA AB SER-ACNC: <0.6 IU/ML
ENA SS-A AB SER IA-ACNC: <0.5 U/ML
ENA SS-A AB SER IA-ACNC: NEGATIVE
ENA SS-B IGG SER IA-ACNC: <0.6 U/ML
ENA SS-B IGG SER IA-ACNC: NEGATIVE

## 2023-01-12 ENCOUNTER — OFFICE VISIT (OUTPATIENT)
Dept: INTERNAL MEDICINE | Facility: CLINIC | Age: 48
End: 2023-01-12
Payer: COMMERCIAL

## 2023-01-12 VITALS
WEIGHT: 157.5 LBS | RESPIRATION RATE: 18 BRPM | DIASTOLIC BLOOD PRESSURE: 68 MMHG | OXYGEN SATURATION: 99 % | TEMPERATURE: 98.1 F | BODY MASS INDEX: 25.31 KG/M2 | HEIGHT: 66 IN | HEART RATE: 98 BPM | SYSTOLIC BLOOD PRESSURE: 101 MMHG

## 2023-01-12 DIAGNOSIS — M06.4 POLYARTHRITIS, INFLAMMATORY (H): Primary | ICD-10-CM

## 2023-01-12 PROCEDURE — 99214 OFFICE O/P EST MOD 30 MIN: CPT | Performed by: INTERNAL MEDICINE

## 2023-01-12 ASSESSMENT — PAIN SCALES - GENERAL: PAINLEVEL: EXTREME PAIN (8)

## 2023-01-12 NOTE — PROGRESS NOTES
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  She has an appointment with rheumatology first week in February to DMAR.  She cannot do the ADLs, she cannot drive to work, writing and typing it is a struggle, she feels exhausted.  I advised her to rest at home and take care of herself.   We filled out the form for FMLA January 5 to January 26.  We will reassess the situation in January 26, but I do not have much hope she will return to work on that time, because the DMAR medications will be started in February, and they take good few weeks to be effective.      (M06.4) Polyarthritis, inflammatory (H)  (primary encounter diagnosis)  Comment:   Plan: f/u w Rheumato        Chief complaint:                                                      Joints pain    SUBJECTIVE:                                                    History of present illness:    Dr Chow saw rheumatology about a week ago, she was started on high-dose of prednisone.  She was diagnosed with inflammatory polyarthritis.  She was started on 20 mg of prednisone which was not enough and her dose was increased to 35 mg.  She called me few days ago that she was still having pain, feeling very tired and she was worried that she could not drive to work and she could not work.  I advised her to rest at home and reschedule her for today.    She is worried that if she does not respond quickly and well to prednisone.  Her pain and inflammation were acceptable while she was on high-dose of prednisone of 35 mg, but as soon as she dropped it to 30, the pain and the inflammation flared up again to the point she could not drive, she could not brush her teeth.  She also feels very tired and even if she sleeps good hours at night she needs to take 1 or 2 naps  "during the day.    Almost all her joints are affected today: Hands feet ankles elbows neck knees.   On exam she has swelling of her hands joints and she cannot make a fist.  She has bilateral knee effusions right more than left.  Right elbow effusion.  Swelling of both ankles and feet.  The shoes do not fit because of the swelling.        Review of Systems:                                                      ROS: negative for fever, chills, cough, wheezes, chest pain, shortness of breath, vomiting, abdominal pain, leg swelling     OBJECTIVE:             Physical exam:  Blood pressure 101/68, pulse 98, temperature 98.1  F (36.7  C), temperature source Tympanic, resp. rate 18, height 1.676 m (5' 6\"), weight 71.4 kg (157 lb 8 oz), SpO2 99 %, not currently breastfeeding.     NAD, appears comfortable  Skin: no rashes   Neck:  Lymph nodes nonpalpable cervical and supraclavicular.  Chest: clear to auscultation bilaterally, good respiratory effort  Heart: S1 S2, RRR, no mgr appreciated  Abdomen: soft, not tender,   Extremities: hands, feet ankle edema  Msk: as above   Neurologic: A, Ox3, no focal signs appreciated    PMHx: reviewed  Past Medical History:   Diagnosis Date     Hypogonadotropic hypogonadism (H)       PSHx: reviewed  Past Surgical History:   Procedure Laterality Date      SECTION          Meds: reviewed  Current Outpatient Medications   Medication Sig Dispense Refill     predniSONE (DELTASONE) 10 MG tablet Take 2 tablets (20 mg) by mouth daily 60 tablet 1       Soc Hx: reviewed  Fam Hx: reviewed      Chart documentation was completed, in part, with [employer] voice-recognition software. Even though reviewed, some grammatical, spelling, and word errors may remain.      Latasha Jama MD  Internal Medicine     "

## 2023-01-18 ENCOUNTER — VIRTUAL VISIT (OUTPATIENT)
Dept: INTERNAL MEDICINE | Facility: CLINIC | Age: 48
End: 2023-01-18
Payer: COMMERCIAL

## 2023-01-18 DIAGNOSIS — R10.32 LLQ ABDOMINAL PAIN: Primary | ICD-10-CM

## 2023-01-18 DIAGNOSIS — M06.4 POLYARTHRITIS, INFLAMMATORY (H): ICD-10-CM

## 2023-01-18 PROCEDURE — 99213 OFFICE O/P EST LOW 20 MIN: CPT | Performed by: INTERNAL MEDICINE

## 2023-01-18 NOTE — PROGRESS NOTES
Trinity is a 47 year old who is being evaluated via a billable video visit.      How would you like to obtain your AVS? Mail a copy  If the video visit is dropped, the invitation should be resent by: Text to cell phone: [phone]  Will anyone else be joining your video visit? No              This is a telephone encounter with the patient.           07:38 --- 07:52            Dr Jama's note      Patient's instructions / PLAN:                                                        Plan:  1. abd CT -- To schedule this test you may call Scheduling center at [phone]    2. If pain is worse or fever, call and I will consider Antibiotics       ASSESSMENT & PLAN:                                                      (R10.32) LLQ abdominal pain  (primary encounter diagnosis)  Comment:   Plan: CT Abdomen Pelvis w Contrast            (M06.4) Polyarthritis, inflammatory (H)  Comment:   Plan: continue prednisone        Chief complaint:                                                      Follow up chronic medical problems      SUBJECTIVE:                                                    History of present illness:      RA  -- she takes Prednisone 15 mg daily  -- still a lot of hands and feet and ankle pain, unable to make a fist and stand for more than 30 min    LLQ pain  -- for 4-5 days  -- some tenderness   -- no diarrhea, no blood in stools  -- mild nausea        History of Present Illness       Reason for visit:  Follow up of polyarthritis    She eats 2-3 servings of fruits and vegetables daily.She consumes 0 sweetened beverage(s) daily.She exercises with enough effort to increase her heart rate 9 or less minutes per day.  She exercises with enough effort to increase her heart rate 3 or less days per week.   She is taking medications regularly.       Review of Systems:                                                      ROS: negative for fever, chills, cough, wheezes, chest pain, shortness of breath, vomiting,   leg swelling Pos for abdominal pain,      OBJECTIVE:           An actual physical exam can't be done during phone visit   A limited exam can sometimes be performed by video visit   NAD      PMHx: reviewed  Past Medical History:   Diagnosis Date     Hypogonadotropic hypogonadism (H)       PSHx: reviewed  Past Surgical History:   Procedure Laterality Date      SECTION          Meds: reviewed  No current outpatient medications on file.       Soc Hx: reviewed  Fam Hx: reviewed        Chart documentation was completed, in part, with [employer] voice-recognition software. Even though reviewed, some grammatical, spelling, and word errors may remain.    Latasha Jama MD  Internal Medicine

## 2023-01-18 NOTE — PATIENT INSTRUCTIONS
Plan:  1. abd CT -- To schedule this test you may call Scheduling center at [phone]    2. If pain is worse or fever, call and I will consider Antibiotics

## 2023-01-24 LAB
EJ AB SER QL: NEGATIVE
ENA JO1 AB SER IA-ACNC: <20 UNITS
ENA PM/SCL AB SER-ACNC: <20 UNITS
ENA SS-A 52KD IGG SER IA-ACNC: <20 UNITS
FIBRILLARIN AB SER QL: NEGATIVE
KU AB SER QL: NEGATIVE
MDA5 AB SER LINE BLOT-ACNC: <20 UNITS
MI2 AB SER QL: NEGATIVE
MJ AB SER LINE BLOT-ACNC: <20 UNITS
OJ AB SER QL: NEGATIVE
PL12 AB SER QL: NEGATIVE
PL7 AB SER QL: NEGATIVE
SAE1 IGG SER QL LINE BLOT: <20 UNITS
SRP AB SERPL QL: NEGATIVE
TIF1-GAMMA AB SER LINE BLOT-ACNC: <20 UNITS
U1 SNRNP AB SER IA-ACNC: <20 UNITS
U2 SNRNP AB SER QL: NEGATIVE

## 2023-01-25 ENCOUNTER — VIRTUAL VISIT (OUTPATIENT)
Dept: INTERNAL MEDICINE | Facility: CLINIC | Age: 48
End: 2023-01-25
Payer: COMMERCIAL

## 2023-01-25 DIAGNOSIS — M06.4 POLYARTHRITIS, INFLAMMATORY (H): Primary | ICD-10-CM

## 2023-01-25 PROCEDURE — 99213 OFFICE O/P EST LOW 20 MIN: CPT | Mod: 95 | Performed by: INTERNAL MEDICINE

## 2023-01-25 NOTE — PROGRESS NOTES
Trinity is a 47 year old who is being evaluated via a billable video visit.      How would you like to obtain your AVS? MyChart  If the video visit is dropped, the invitation should be resent by: Send to e-mail at: [email or web address]  Will anyone else be joining your video visit? No          This is a VIDEO ( using D[employer]im[employer])  encounter with the patient.       Location of the provider : office   Location of the patient : home        07:40 --- 08:12           Dr Jama's note      ASSESSMENT & PLAN:                                                      (M06.4) Polyarthritis, inflammatory (H)  (primary encounter diagnosis)  Comment: ongoing inflammation with pain and decreased ROM  Plan: continue prednisone, f/u w Rheumato     Today we filled out the FMLA form and workability form. We will reevaluate in about 2 weeks.        Chief complaint:                                                      Inflammatory arthritis   FMLA     SUBJECTIVE:                                                    History of present illness:    Hands and Wrists:  -- 10 % better, but she is still unable to type or write    R knee  -- little better    L knee + L ankle  -- much worse. -- 2-3 at rest but goes to 7 when she walks and it makes her limp.     Neck stiffness  -- for the first time time she woke up this am with severe neck stiffness  -- the stiffness is better after 1.5h       Prednisone 10 mg daily for the last 2 days    Fatigue: persists. Can't do any house chores.     Patient noted the only medication she is taking is Prednisone at this time.    History of Present Illness       Reason for visit:  Follow up of polyarthritis    She eats 2-3 servings of fruits and vegetables daily.She consumes 0 sweetened beverage(s) daily.She exercises with enough effort to increase her heart rate 9 or less minutes per day.  She exercises with enough effort to increase her heart rate 3 or less days per week.   She is taking medications  regularly.         Review of Systems:                                                      ROS: negative for fever, chills, cough, wheezes, chest pain, shortness of breath, vomiting, abdominal pain, leg swelling       OBJECTIVE:           An actual physical exam can't be done during phone visit   A limited exam can sometimes be performed by video visit   NAD      PMHx: reviewed  Past Medical History:   Diagnosis Date     Hypogonadotropic hypogonadism (H)       PSHx: reviewed  Past Surgical History:   Procedure Laterality Date      SECTION          Meds: reviewed  No current outpatient medications on file.       Soc Hx: reviewed  Fam Hx: reviewed        Chart documentation was completed, in part, with [employer] voice-recognition software. Even though reviewed, some grammatical, spelling, and word errors may remain.    Latasha Jama MD  Internal Medicine

## 2023-01-26 ENCOUNTER — ANCILLARY PROCEDURE (OUTPATIENT)
Dept: CT IMAGING | Facility: CLINIC | Age: 48
End: 2023-01-26
Attending: INTERNAL MEDICINE
Payer: COMMERCIAL

## 2023-01-26 DIAGNOSIS — R10.32 LLQ ABDOMINAL PAIN: ICD-10-CM

## 2023-01-26 PROCEDURE — 74177 CT ABD & PELVIS W/CONTRAST: CPT

## 2023-01-26 PROCEDURE — 250N000011 HC RX IP 250 OP 636: Performed by: INTERNAL MEDICINE

## 2023-01-26 RX ORDER — IOPAMIDOL 755 MG/ML
100 INJECTION, SOLUTION INTRAVASCULAR ONCE
Status: COMPLETED | OUTPATIENT
Start: 2023-01-26 | End: 2023-01-26

## 2023-01-26 RX ADMIN — IOPAMIDOL 100 ML: 755 INJECTION, SOLUTION INTRAVENOUS at 12:52

## 2023-02-06 ENCOUNTER — TRANSFERRED RECORDS (OUTPATIENT)
Dept: HEALTH INFORMATION MANAGEMENT | Facility: CLINIC | Age: 48
End: 2023-02-06

## 2023-02-09 ENCOUNTER — TRANSFERRED RECORDS (OUTPATIENT)
Dept: INTERNAL MEDICINE | Facility: CLINIC | Age: 48
End: 2023-02-09

## 2023-02-09 ENCOUNTER — TELEPHONE (OUTPATIENT)
Dept: INTERNAL MEDICINE | Facility: CLINIC | Age: 48
End: 2023-02-09

## 2023-02-09 ENCOUNTER — VIRTUAL VISIT (OUTPATIENT)
Dept: INTERNAL MEDICINE | Facility: CLINIC | Age: 48
End: 2023-02-09
Payer: COMMERCIAL

## 2023-02-09 DIAGNOSIS — M06.4 POLYARTHRITIS, INFLAMMATORY (H): Primary | ICD-10-CM

## 2023-02-09 PROCEDURE — 99213 OFFICE O/P EST LOW 20 MIN: CPT | Mod: VID | Performed by: INTERNAL MEDICINE

## 2023-02-09 RX ORDER — FOLIC ACID 1 MG/1
1 TABLET ORAL DAILY
COMMUNITY

## 2023-02-09 RX ORDER — PREDNISONE 5 MG/1
5 TABLET ORAL DAILY
COMMUNITY
End: 2023-04-04

## 2023-02-09 NOTE — PROGRESS NOTES
Trinity is a 47 year old who is being evaluated via a billable video visit.      How would you like to obtain your AVS? Mail a copy  If the video visit is dropped, the invitation should be resent by: Text to cell phone: [phone]  Will anyone else be joining your video visit? No          This is a VIDEO ( using [employer])  encounter with the patient.       Location of the provider : office   Location of the patient : home      09:10 --- 09:40           Dr Jama's note          ASSESSMENT & PLAN:                                                      (M06.4) Polyarthritis, inflammatory (H)  (primary encounter diagnosis)  Comment: still pain and inflammation  Plan: Parvovirus B19 Antibody IgG          Next appointment     FMLA - done       Chief complaint:                                                    polyarthritis    SUBJECTIVE:                                                    History of present illness:    rheumato started methotrexate , still a lot of inflammation despite prednisone.   Advised her off work for at least 3 more weeks   She still has pain and inflammation hands and ankles        Review of Systems:                                                      ROS: negative for fever, chills, cough, wheezes, chest pain, shortness of breath, vomiting, abdominal pain, leg swelling     OBJECTIVE:           An actual physical exam can't be done during phone visit   A limited exam can sometimes be performed by video visit   NAD      PMHx: reviewed  Past Medical History:   Diagnosis Date     Hypogonadotropic hypogonadism (H)       PSHx: reviewed  Past Surgical History:   Procedure Laterality Date      SECTION          Meds: reviewed  Current Outpatient Medications   Medication Sig Dispense Refill     folic acid (FOLVITE) 1 MG tablet Take 1 mg by mouth daily       METHOTREXate, PF, (OTREXUP) 15 MG/0.4ML pen Inject 15 mg Subcutaneous every 7 days       predniSONE (DELTASONE) 5 MG tablet Take 5 mg by  mouth daily         Soc Hx: reviewed  Fam Hx: reviewed        Chart documentation was completed, in part, with [employer] voice-recognition software. Even though reviewed, some grammatical, spelling, and word errors may remain.    Latasha Jama MD  Internal Medicine

## 2023-02-28 ENCOUNTER — VIRTUAL VISIT (OUTPATIENT)
Dept: INTERNAL MEDICINE | Facility: CLINIC | Age: 48
End: 2023-02-28
Payer: COMMERCIAL

## 2023-02-28 ENCOUNTER — TELEPHONE (OUTPATIENT)
Dept: INTERNAL MEDICINE | Facility: CLINIC | Age: 48
End: 2023-02-28

## 2023-02-28 DIAGNOSIS — M06.4 POLYARTHRITIS, INFLAMMATORY (H): Primary | ICD-10-CM

## 2023-02-28 DIAGNOSIS — Z02.9 ADMINISTRATIVE ENCOUNTER: ICD-10-CM

## 2023-02-28 PROCEDURE — 99213 OFFICE O/P EST LOW 20 MIN: CPT | Mod: VID | Performed by: INTERNAL MEDICINE

## 2023-02-28 NOTE — PROGRESS NOTES
Trinity is a 47 year old who is being evaluated via a billable video visit.      How would you like to obtain your AVS? Mail a copy  If the video visit is dropped, the invitation should be resent by: Text to cell phone: [phone]  Will anyone else be joining your video visit? No          This is a VIDEO ( using [employer])  encounter with the patient.       Location of the provider : office   Location of the patient : home      09:03 --- 09:32          Dr Jama's note        ASSESSMENT & PLAN:                                                      (M06.4) Polyarthritis, inflammatory (H)  (primary encounter diagnosis)  Comment:   Plan: off work until MArch 15 -- FMLA papers         (Z02.9) Administrative encounter  Comment:   Plan: FMLA          Chief complaint:                                                      RA     SUBJECTIVE:                                                    History of present illness:      Rheumatology decrewased the prednisone to 5 mg. Couple of days after the smaller dose, she noticed a flare up of pain and swelling in her joints. The wrists are particular more painful than the other joints.  She also noticed cedrick and click in the cervical spine.    She communicated with rheumatology office and she was advised to  increase methotrexate dose.  . She has f/u appointment on March 9 to discuss further treatment.    She feels more tired since lower dose prednisone    I advised her to continue treatment as per rheumatology,    I advised to rest at home and avoid using too much  the painful joint     FMLA forms done      Review of Systems:                                                      ROS: negative for fever, chills, cough, wheezes, chest pain, shortness of breath, vomiting, abdominal pain, leg swelling       OBJECTIVE:           An actual physical exam can't be done during phone visit   A limited exam can sometimes be performed by video visit   NAD  Wrists are swollen       PMHx:  reviewed  Past Medical History:   Diagnosis Date     Hypogonadotropic hypogonadism (H)       PSHx: reviewed  Past Surgical History:   Procedure Laterality Date      SECTION          Meds: reviewed  Current Outpatient Medications   Medication Sig Dispense Refill     folic acid (FOLVITE) 1 MG tablet Take 1 mg by mouth daily       METHOTREXate, PF, (OTREXUP) 15 MG/0.4ML pen Inject 15 mg Subcutaneous every 7 days       predniSONE (DELTASONE) 5 MG tablet Take 5 mg by mouth daily         Soc Hx: reviewed  Fam Hx: reviewed        Chart documentation was completed, in part, with "[employer] voice-recognition software. Even though reviewed, some grammatical, spelling, and word errors may remain.    Latasha Jama MD  Internal Medicine

## 2023-03-14 ENCOUNTER — VIRTUAL VISIT (OUTPATIENT)
Dept: INTERNAL MEDICINE | Facility: CLINIC | Age: 48
End: 2023-03-14
Payer: COMMERCIAL

## 2023-03-14 ENCOUNTER — TELEPHONE (OUTPATIENT)
Dept: INTERNAL MEDICINE | Facility: CLINIC | Age: 48
End: 2023-03-14

## 2023-03-14 DIAGNOSIS — M06.4 POLYARTHRITIS, INFLAMMATORY (H): Primary | ICD-10-CM

## 2023-03-14 PROCEDURE — 99213 OFFICE O/P EST LOW 20 MIN: CPT | Mod: VID | Performed by: INTERNAL MEDICINE

## 2023-03-14 NOTE — PROGRESS NOTES
Trinity is a 47 year old who is being evaluated via a billable video visit.      How would you like to obtain your AVS? Mail a copy  If the video visit is dropped, the invitation should be resent by: Text to cell phone: [phone]  Will anyone else be joining your video visit? No            This is a VIDEO ( using D[employer])  encounter with the patient.       Location of the provider : office   Location of the patient : home      11:09 --- 11:26              Dr Jama's note          ASSESSMENT & PLAN:                                                      (M06.4) Polyarthritis, inflammatory (H)  (primary encounter diagnosis)  Comment: The treatment is managed by the rheumatology  Plan: I managed the work restrictions.  FMLA with no work until April 24.  On April 4 we will reassess.  On April 4 she will be after 1 week of prednisone 5 mg.       Chief complaint:                                                      RA     SUBJECTIVE:                                                    History of present illness:    F/u on imflam arthritis    After she reduce the prednisone from 7.5 to 5 mg, she developed a flareup of the inflammatory arthritis.  The rheumatologist increased her methotrexate to 8 mg.  She feels that 7.5 mg is helping her but she is getting side effect from the long term prednisone, such as moon face.  The joints are better but not back to baseline.  The R wrist is still swollen and limits the ROM.  Ankles are better but still swollen        Review of Systems:                                                      ROS: negative for fever, chills, cough, wheezes, chest pain, shortness of breath, vomiting, abdominal pain, leg swelling     OBJECTIVE:           An actual physical exam can't be done during phone visit   A limited exam can sometimes be performed by video visit   NAD      PMHx: reviewed  Past Medical History:   Diagnosis Date     Hypogonadotropic hypogonadism (H)       PSHx: reviewed  Past  Surgical History:   Procedure Laterality Date      SECTION          Meds: reviewed  Current Outpatient Medications   Medication Sig Dispense Refill     folic acid (FOLVITE) 1 MG tablet Take 1 mg by mouth daily       METHOTREXate, PF, (OTREXUP) 15 MG/0.4ML pen Inject 15 mg Subcutaneous every 7 days       predniSONE (DELTASONE) 5 MG tablet Take 5 mg by mouth daily         Soc Hx: reviewed  Fam Hx: reviewed        Chart documentation was completed, in part, with [employer] voice-recognition software. Even though reviewed, some grammatical, spelling, and word errors may remain.    Latasha Jama MD  Internal Medicine

## 2023-03-27 ENCOUNTER — LAB (OUTPATIENT)
Dept: LAB | Facility: CLINIC | Age: 48
End: 2023-03-27
Payer: COMMERCIAL

## 2023-03-27 DIAGNOSIS — M06.4 POLYARTHRITIS, INFLAMMATORY (H): ICD-10-CM

## 2023-03-27 PROCEDURE — 86747 PARVOVIRUS ANTIBODY: CPT | Mod: 90

## 2023-03-27 PROCEDURE — 36415 COLL VENOUS BLD VENIPUNCTURE: CPT

## 2023-03-27 PROCEDURE — 99000 SPECIMEN HANDLING OFFICE-LAB: CPT

## 2023-03-29 LAB — B19V IGG SER IA-ACNC: 4.09 IV

## 2023-04-04 ENCOUNTER — VIRTUAL VISIT (OUTPATIENT)
Dept: INTERNAL MEDICINE | Facility: CLINIC | Age: 48
End: 2023-04-04
Payer: COMMERCIAL

## 2023-04-04 DIAGNOSIS — M06.4 POLYARTHRITIS, INFLAMMATORY (H): Primary | ICD-10-CM

## 2023-04-04 PROCEDURE — 99213 OFFICE O/P EST LOW 20 MIN: CPT | Mod: VID | Performed by: INTERNAL MEDICINE

## 2023-04-04 RX ORDER — PREDNISONE 5 MG/1
5 TABLET ORAL DAILY
Qty: 30 TABLET | Refills: 1 | Status: SHIPPED | OUTPATIENT
Start: 2023-04-04 | End: 2024-07-22

## 2023-04-04 NOTE — PROGRESS NOTES
Trinity is a 47 year old who is being evaluated via a billable video visit.      How would you like to obtain your AVS? Mail a copy  If the video visit is dropped, the invitation should be resent by: Text to cell phone: [phone]  Will anyone else be joining your video visit? No          This is a VIDEO ( using D[employer]imity)  encounter with the patient.       Location of the provider : office   Location of the patient : home      17:11 --- 17:40            4/4/2023     1:21 PM   Additional Questions   Roomed by Zonia Jama's note      Patient's instructions / PLAN:                                                        Plan:  1. I believe you are able to return to work next week, but not full time/full speed.   2. I advised to talk to manager to accommodate for half time for the next month and no night shift.      ASSESSMENT & PLAN:                                                      (M06.4) Polyarthritis, inflammatory (H)  (primary encounter diagnosis)  Comment:   Plan: predniSONE (DELTASONE) 5 MG tablet             Rheumatoid arthritis.  It took a long time to get better with methotrexate and prednisone.  She would like to return to work.  I think she can return to work, but not for the time nor full speed.  I advised her to talk to the manager and come up with a plan for at least the following 2 shifts.  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    Chief complaint:                                                      RA    SUBJECTIVE:                                                    History of present illness:    She is feeling better with the joints.  She is on methotrexate and still on prednisone 5 mg.  She would like to return to work.  She is scheduled to work next week.  She went for a walk with her  and after a mile she felt very weak and needed to return home.         Review of Systems:                                                       ROS: negative for fever, chills, cough, wheezes, chest pain, shortness of breath, vomiting, abdominal pain, leg swelling    OBJECTIVE:           An actual physical exam can't be done during phone visit   A limited exam can sometimes be performed by video visit   NAD      PMHx: reviewed  Past Medical History:   Diagnosis Date     Hypogonadotropic hypogonadism (H)       PSHx: reviewed  Past Surgical History:   Procedure Laterality Date      SECTION          Meds: reviewed  Current Outpatient Medications   Medication Sig Dispense Refill     folic acid (FOLVITE) 1 MG tablet Take 1 mg by mouth daily       METHOTREXate, PF, (OTREXUP) 15 MG/0.4ML pen Inject 15 mg Subcutaneous every 7 days       predniSONE (DELTASONE) 5 MG tablet Take 5 mg by mouth daily         Soc Hx: reviewed  Fam Hx: reviewed        Chart documentation was completed, in part, with [employer] voice-recognition software. Even though reviewed, some grammatical, spelling, and word errors may remain.    Latsaha Jama MD  Internal Medicine

## 2023-04-09 ENCOUNTER — HEALTH MAINTENANCE LETTER (OUTPATIENT)
Age: 48
End: 2023-04-09

## 2023-04-11 ENCOUNTER — TELEPHONE (OUTPATIENT)
Dept: INTERNAL MEDICINE | Facility: CLINIC | Age: 48
End: 2023-04-11
Payer: COMMERCIAL

## 2023-05-12 ENCOUNTER — HOSPITAL ENCOUNTER (OUTPATIENT)
Dept: MAMMOGRAPHY | Facility: CLINIC | Age: 48
Discharge: HOME | End: 2023-05-12
Attending: INTERNAL MEDICINE | Admitting: INTERNAL MEDICINE
Payer: COMMERCIAL

## 2023-05-12 DIAGNOSIS — Z12.31 VISIT FOR SCREENING MAMMOGRAM: ICD-10-CM

## 2023-05-12 PROCEDURE — 77063 BREAST TOMOSYNTHESIS BI: CPT

## 2023-05-22 ENCOUNTER — VIRTUAL VISIT (OUTPATIENT)
Dept: INTERNAL MEDICINE | Facility: CLINIC | Age: 48
End: 2023-05-22
Payer: COMMERCIAL

## 2023-05-22 DIAGNOSIS — Z53.9 ERRONEOUS ENCOUNTER--DISREGARD: Primary | ICD-10-CM

## 2023-05-23 ENCOUNTER — MYC MEDICAL ADVICE (OUTPATIENT)
Dept: INTERNAL MEDICINE | Facility: CLINIC | Age: 48
End: 2023-05-23
Payer: COMMERCIAL

## 2023-06-01 DIAGNOSIS — M06.4 POLYARTHRITIS, INFLAMMATORY (H): ICD-10-CM

## 2023-06-01 DIAGNOSIS — M06.4 INFLAMMATORY POLYARTHROPATHY (H): ICD-10-CM

## 2023-06-01 DIAGNOSIS — M06.4 POLYARTHRITIS, INFLAMMATORY (H): Primary | ICD-10-CM

## 2023-06-07 ENCOUNTER — LAB (OUTPATIENT)
Dept: LAB | Facility: CLINIC | Age: 48
End: 2023-06-07
Payer: COMMERCIAL

## 2023-06-07 DIAGNOSIS — M06.4 INFLAMMATORY POLYARTHROPATHY (H): ICD-10-CM

## 2023-06-07 DIAGNOSIS — M06.4 POLYARTHRITIS, INFLAMMATORY (H): ICD-10-CM

## 2023-06-07 LAB
ALBUMIN SERPL BCG-MCNC: 4.4 G/DL (ref 3.5–5.2)
ALP SERPL-CCNC: 71 U/L (ref 35–104)
ALT SERPL W P-5'-P-CCNC: 14 U/L (ref 10–35)
ANION GAP SERPL CALCULATED.3IONS-SCNC: 13 MMOL/L (ref 7–15)
AST SERPL W P-5'-P-CCNC: 23 U/L (ref 10–35)
BILIRUB SERPL-MCNC: 0.2 MG/DL
BUN SERPL-MCNC: 12.7 MG/DL (ref 6–20)
CALCIUM SERPL-MCNC: 9.7 MG/DL (ref 8.6–10)
CHLORIDE SERPL-SCNC: 105 MMOL/L (ref 98–107)
CREAT SERPL-MCNC: 0.79 MG/DL (ref 0.51–0.95)
CRP SERPL-MCNC: 6.22 MG/L
DEPRECATED HCO3 PLAS-SCNC: 24 MMOL/L (ref 22–29)
ERYTHROCYTE [DISTWIDTH] IN BLOOD BY AUTOMATED COUNT: 13.9 % (ref 10–15)
ERYTHROCYTE [SEDIMENTATION RATE] IN BLOOD BY WESTERGREN METHOD: 15 MM/HR (ref 0–20)
GFR SERPL CREATININE-BSD FRML MDRD: >90 ML/MIN/1.73M2
GLUCOSE SERPL-MCNC: 85 MG/DL (ref 70–99)
HCT VFR BLD AUTO: 39.6 % (ref 35–47)
HGB BLD-MCNC: 13.3 G/DL (ref 11.7–15.7)
MCH RBC QN AUTO: 28.8 PG (ref 26.5–33)
MCHC RBC AUTO-ENTMCNC: 33.6 G/DL (ref 31.5–36.5)
MCV RBC AUTO: 86 FL (ref 78–100)
PLATELET # BLD AUTO: 301 10E3/UL (ref 150–450)
POTASSIUM SERPL-SCNC: 4.2 MMOL/L (ref 3.4–5.3)
PROT SERPL-MCNC: 7.5 G/DL (ref 6.4–8.3)
RBC # BLD AUTO: 4.62 10E6/UL (ref 3.8–5.2)
SODIUM SERPL-SCNC: 142 MMOL/L (ref 136–145)
WBC # BLD AUTO: 6.3 10E3/UL (ref 4–11)

## 2023-06-07 PROCEDURE — 85027 COMPLETE CBC AUTOMATED: CPT

## 2023-06-07 PROCEDURE — 80053 COMPREHEN METABOLIC PANEL: CPT

## 2023-06-07 PROCEDURE — 36415 COLL VENOUS BLD VENIPUNCTURE: CPT

## 2023-06-07 PROCEDURE — 85652 RBC SED RATE AUTOMATED: CPT

## 2023-06-07 PROCEDURE — 86140 C-REACTIVE PROTEIN: CPT

## 2023-06-09 DIAGNOSIS — Z51.81 ENCOUNTER FOR THERAPEUTIC DRUG LEVEL MONITORING: Primary | ICD-10-CM

## 2023-06-20 ENCOUNTER — MYC MEDICAL ADVICE (OUTPATIENT)
Dept: INTERNAL MEDICINE | Facility: CLINIC | Age: 48
End: 2023-06-20
Payer: COMMERCIAL

## 2023-06-20 DIAGNOSIS — D84.821 IMMUNOCOMPROMISED DUE TO CORTICOSTEROIDS (H): ICD-10-CM

## 2023-06-20 DIAGNOSIS — T38.0X5A IMMUNOCOMPROMISED DUE TO CORTICOSTEROIDS (H): ICD-10-CM

## 2023-06-20 DIAGNOSIS — Z78.9 PATIENT TRAVELS: Primary | ICD-10-CM

## 2023-06-20 DIAGNOSIS — Z79.52 IMMUNOCOMPROMISED DUE TO CORTICOSTEROIDS (H): ICD-10-CM

## 2023-06-20 RX ORDER — ATOVAQUONE AND PROGUANIL HYDROCHLORIDE 250; 100 MG/1; MG/1
1 TABLET, FILM COATED ORAL DAILY
Qty: 40 TABLET | Refills: 0 | Status: SHIPPED | OUTPATIENT
Start: 2023-06-20 | End: 2024-07-22

## 2023-06-20 RX ORDER — LEVOFLOXACIN 500 MG/1
500 TABLET, FILM COATED ORAL DAILY
Qty: 10 TABLET | Refills: 0 | Status: SHIPPED | OUTPATIENT
Start: 2023-06-20 | End: 2023-08-08

## 2023-06-20 RX ORDER — FLUTICASONE PROPIONATE 50 MCG
1 SPRAY, SUSPENSION (ML) NASAL DAILY
Qty: 16 G | Refills: 3 | Status: SHIPPED | OUTPATIENT
Start: 2023-06-20 | End: 2023-11-15

## 2023-06-20 RX ORDER — AZITHROMYCIN 250 MG/1
500 TABLET, FILM COATED ORAL DAILY
Qty: 12 TABLET | Refills: 0 | Status: SHIPPED | OUTPATIENT
Start: 2023-06-20 | End: 2023-08-08

## 2023-06-22 ENCOUNTER — LAB (OUTPATIENT)
Dept: LAB | Facility: CLINIC | Age: 48
End: 2023-06-22
Payer: COMMERCIAL

## 2023-06-22 DIAGNOSIS — Z51.81 ENCOUNTER FOR THERAPEUTIC DRUG LEVEL MONITORING: ICD-10-CM

## 2023-06-22 LAB
ALT SERPL W P-5'-P-CCNC: 18 U/L (ref 0–50)
AST SERPL W P-5'-P-CCNC: 39 U/L (ref 0–45)
BASOPHILS # BLD AUTO: 0 10E3/UL (ref 0–0.2)
BASOPHILS NFR BLD AUTO: 1 %
CREAT SERPL-MCNC: 0.78 MG/DL (ref 0.51–0.95)
CRP SERPL-MCNC: 7.98 MG/L
EOSINOPHIL # BLD AUTO: 0.1 10E3/UL (ref 0–0.7)
EOSINOPHIL NFR BLD AUTO: 1 %
ERYTHROCYTE [DISTWIDTH] IN BLOOD BY AUTOMATED COUNT: 13.8 % (ref 10–15)
ERYTHROCYTE [SEDIMENTATION RATE] IN BLOOD BY WESTERGREN METHOD: 15 MM/HR (ref 0–20)
GFR SERPL CREATININE-BSD FRML MDRD: >90 ML/MIN/1.73M2
HCT VFR BLD AUTO: 40.9 % (ref 35–47)
HGB BLD-MCNC: 13.5 G/DL (ref 11.7–15.7)
IMM GRANULOCYTES # BLD: 0 10E3/UL
IMM GRANULOCYTES NFR BLD: 0 %
LYMPHOCYTES # BLD AUTO: 2.9 10E3/UL (ref 0.8–5.3)
LYMPHOCYTES NFR BLD AUTO: 34 %
MCH RBC QN AUTO: 28.4 PG (ref 26.5–33)
MCHC RBC AUTO-ENTMCNC: 33 G/DL (ref 31.5–36.5)
MCV RBC AUTO: 86 FL (ref 78–100)
MONOCYTES # BLD AUTO: 0.6 10E3/UL (ref 0–1.3)
MONOCYTES NFR BLD AUTO: 7 %
NEUTROPHILS # BLD AUTO: 5 10E3/UL (ref 1.6–8.3)
NEUTROPHILS NFR BLD AUTO: 58 %
PLATELET # BLD AUTO: 325 10E3/UL (ref 150–450)
RBC # BLD AUTO: 4.76 10E6/UL (ref 3.8–5.2)
WBC # BLD AUTO: 8.5 10E3/UL (ref 4–11)

## 2023-06-22 PROCEDURE — 84460 ALANINE AMINO (ALT) (SGPT): CPT

## 2023-06-22 PROCEDURE — 86140 C-REACTIVE PROTEIN: CPT

## 2023-06-22 PROCEDURE — 82565 ASSAY OF CREATININE: CPT

## 2023-06-22 PROCEDURE — 85025 COMPLETE CBC W/AUTO DIFF WBC: CPT

## 2023-06-22 PROCEDURE — 84450 TRANSFERASE (AST) (SGOT): CPT

## 2023-06-22 PROCEDURE — 85652 RBC SED RATE AUTOMATED: CPT

## 2023-06-22 PROCEDURE — 36415 COLL VENOUS BLD VENIPUNCTURE: CPT

## 2023-06-23 ENCOUNTER — MYC MEDICAL ADVICE (OUTPATIENT)
Dept: INTERNAL MEDICINE | Facility: CLINIC | Age: 48
End: 2023-06-23
Payer: COMMERCIAL

## 2023-06-23 DIAGNOSIS — T75.3XXA MOTION SICKNESS, INITIAL ENCOUNTER: Primary | ICD-10-CM

## 2023-06-23 RX ORDER — ONDANSETRON 8 MG/1
8 TABLET, ORALLY DISINTEGRATING ORAL EVERY 8 HOURS PRN
Qty: 30 TABLET | Refills: 0 | Status: SHIPPED | OUTPATIENT
Start: 2023-06-23

## 2023-06-23 RX ORDER — SCOLOPAMINE TRANSDERMAL SYSTEM 1 MG/1
1 PATCH, EXTENDED RELEASE TRANSDERMAL
Qty: 10 PATCH | Refills: 1 | Status: SHIPPED | OUTPATIENT
Start: 2023-06-23 | End: 2023-08-08

## 2023-07-31 ENCOUNTER — MYC MEDICAL ADVICE (OUTPATIENT)
Dept: INTERNAL MEDICINE | Facility: CLINIC | Age: 48
End: 2023-07-31
Payer: COMMERCIAL

## 2023-08-03 ENCOUNTER — MYC MEDICAL ADVICE (OUTPATIENT)
Dept: INTERNAL MEDICINE | Facility: CLINIC | Age: 48
End: 2023-08-03

## 2023-08-08 DIAGNOSIS — M06.4 INFLAMMATORY POLYARTHROPATHY (H): ICD-10-CM

## 2023-08-08 DIAGNOSIS — M06.4 POLYARTHRITIS, INFLAMMATORY (H): ICD-10-CM

## 2023-08-21 ENCOUNTER — PATIENT OUTREACH (OUTPATIENT)
Dept: CARE COORDINATION | Facility: CLINIC | Age: 48
End: 2023-08-21
Payer: COMMERCIAL

## 2023-09-04 ENCOUNTER — PATIENT OUTREACH (OUTPATIENT)
Dept: CARE COORDINATION | Facility: CLINIC | Age: 48
End: 2023-09-04
Payer: COMMERCIAL

## 2023-10-12 ENCOUNTER — TRANSFERRED RECORDS (OUTPATIENT)
Dept: HEALTH INFORMATION MANAGEMENT | Facility: CLINIC | Age: 48
End: 2023-10-12
Payer: COMMERCIAL

## 2023-11-15 DIAGNOSIS — D84.821 IMMUNOCOMPROMISED DUE TO CORTICOSTEROIDS (H): ICD-10-CM

## 2023-11-15 DIAGNOSIS — T38.0X5A IMMUNOCOMPROMISED DUE TO CORTICOSTEROIDS (H): ICD-10-CM

## 2023-11-15 DIAGNOSIS — Z78.9 PATIENT TRAVELS: ICD-10-CM

## 2023-11-15 DIAGNOSIS — Z79.52 IMMUNOCOMPROMISED DUE TO CORTICOSTEROIDS (H): ICD-10-CM

## 2023-11-15 DIAGNOSIS — R53.81 PHYSICAL DECONDITIONING: ICD-10-CM

## 2023-11-15 DIAGNOSIS — R09.82 POSTNASAL DRIP: ICD-10-CM

## 2023-11-15 DIAGNOSIS — M06.4 POLYARTHRITIS, INFLAMMATORY (H): Primary | ICD-10-CM

## 2023-11-15 RX ORDER — FLUTICASONE PROPIONATE 50 MCG
1 SPRAY, SUSPENSION (ML) NASAL DAILY
Qty: 16 G | Refills: 11 | Status: SHIPPED | OUTPATIENT
Start: 2023-11-15

## 2023-11-15 RX ORDER — IPRATROPIUM BROMIDE 42 UG/1
2 SPRAY, METERED NASAL 4 TIMES DAILY
Qty: 15 ML | Refills: 4 | Status: SHIPPED | OUTPATIENT
Start: 2023-11-15

## 2023-11-18 ENCOUNTER — HEALTH MAINTENANCE LETTER (OUTPATIENT)
Age: 48
End: 2023-11-18

## 2023-12-13 ENCOUNTER — MYC MEDICAL ADVICE (OUTPATIENT)
Dept: INTERNAL MEDICINE | Facility: CLINIC | Age: 48
End: 2023-12-13
Payer: COMMERCIAL

## 2023-12-15 DIAGNOSIS — E55.9 VITAMIN D DEFICIENCY: Primary | ICD-10-CM

## 2023-12-15 DIAGNOSIS — Z79.52 CURRENT CHRONIC USE OF SYSTEMIC STEROIDS: ICD-10-CM

## 2023-12-18 ENCOUNTER — TELEPHONE (OUTPATIENT)
Dept: INTERNAL MEDICINE | Facility: CLINIC | Age: 48
End: 2023-12-18
Payer: COMMERCIAL

## 2024-01-05 ENCOUNTER — TELEPHONE (OUTPATIENT)
Dept: INTERNAL MEDICINE | Facility: CLINIC | Age: 49
End: 2024-01-05
Payer: COMMERCIAL

## 2024-01-05 NOTE — TELEPHONE ENCOUNTER
Patient Quality Outreach    Patient is due for the following:   Physical Preventive Adult Physical    Next Steps:   Schedule a Adult Preventative    Type of outreach:    Sent [employer] message.    Next Steps:  Reach out within 90 days via Phone.    Max number of attempts reached: No. Will try again in 90 days if patient still on fail list.    Questions for provider review:    None           Zonia Sosa

## 2024-01-23 ENCOUNTER — TRANSFERRED RECORDS (OUTPATIENT)
Dept: HEALTH INFORMATION MANAGEMENT | Facility: CLINIC | Age: 49
End: 2024-01-23
Payer: COMMERCIAL

## 2024-01-31 ENCOUNTER — TELEPHONE (OUTPATIENT)
Dept: INTERNAL MEDICINE | Facility: CLINIC | Age: 49
End: 2024-01-31
Payer: COMMERCIAL

## 2024-01-31 ENCOUNTER — TRANSFERRED RECORDS (OUTPATIENT)
Dept: HEALTH INFORMATION MANAGEMENT | Facility: CLINIC | Age: 49
End: 2024-01-31
Payer: COMMERCIAL

## 2024-01-31 PROBLEM — M06.00 SERONEGATIVE RHEUMATOID ARTHRITIS (H): Status: ACTIVE | Noted: 2024-01-31

## 2024-01-31 LAB
ALT SERPL-CCNC: 17 IU/L (ref 5–35)
AST SERPL-CCNC: 23 U/L (ref 5–34)
CREATININE (EXTERNAL): 0.77 MG/DL (ref 0.5–1.3)
HBA1C MFR BLD: 5 %

## 2024-01-31 NOTE — TELEPHONE ENCOUNTER
[employer] -- disability papers filled out today.    Please fax the form to [employer]  Please fax a copy to Rheumatology, dr Branch ( fax number [phone])  Dr Chow askedto  fax a copy of these forms to dr Branch because she has ryann today   3. Please keep a copy for epic chart  4. Please mail a copy to dr Chow     Thank you

## 2024-02-06 ENCOUNTER — MEDICAL CORRESPONDENCE (OUTPATIENT)
Dept: SCHEDULING | Facility: CLINIC | Age: 49
End: 2024-02-06
Payer: COMMERCIAL

## 2024-02-23 ENCOUNTER — MYC MEDICAL ADVICE (OUTPATIENT)
Dept: INTERNAL MEDICINE | Facility: CLINIC | Age: 49
End: 2024-02-23
Payer: COMMERCIAL

## 2024-02-23 DIAGNOSIS — M06.4 POLYARTHRITIS, INFLAMMATORY (H): ICD-10-CM

## 2024-02-23 DIAGNOSIS — M06.4 INFLAMMATORY POLYARTHROPATHY (H): ICD-10-CM

## 2024-03-05 ENCOUNTER — TRANSFERRED RECORDS (OUTPATIENT)
Dept: HEALTH INFORMATION MANAGEMENT | Facility: CLINIC | Age: 49
End: 2024-03-05
Payer: COMMERCIAL

## 2024-03-05 LAB
ALT SERPL-CCNC: 19 IU/L (ref 5–35)
AST SERPL-CCNC: 22 U/L (ref 5–34)
CREATININE (EXTERNAL): 0.8 MG/DL (ref 0.5–1.3)

## 2024-03-11 ENCOUNTER — MYC MEDICAL ADVICE (OUTPATIENT)
Dept: INTERNAL MEDICINE | Facility: CLINIC | Age: 49
End: 2024-03-11
Payer: COMMERCIAL

## 2024-04-08 ENCOUNTER — MYC MEDICAL ADVICE (OUTPATIENT)
Dept: INTERNAL MEDICINE | Facility: CLINIC | Age: 49
End: 2024-04-08
Payer: COMMERCIAL

## 2024-04-08 ENCOUNTER — ALLIED HEALTH/NURSE VISIT (OUTPATIENT)
Dept: INTERNAL MEDICINE | Facility: CLINIC | Age: 49
End: 2024-04-08
Payer: COMMERCIAL

## 2024-04-08 DIAGNOSIS — J02.9 SORE THROAT: Primary | ICD-10-CM

## 2024-04-08 DIAGNOSIS — R05.9 COUGH: Primary | ICD-10-CM

## 2024-04-08 DIAGNOSIS — J06.9 UPPER RESPIRATORY TRACT INFECTION, UNSPECIFIED TYPE: ICD-10-CM

## 2024-04-08 DIAGNOSIS — D84.9 IMMUNOCOMPROMISED PATIENT (H): ICD-10-CM

## 2024-04-08 LAB
DEPRECATED S PYO AG THROAT QL EIA: NEGATIVE
FLUAV RNA SPEC QL NAA+PROBE: NEGATIVE
FLUBV RNA RESP QL NAA+PROBE: NEGATIVE
GROUP A STREP BY PCR: NOT DETECTED
RSV RNA SPEC NAA+PROBE: NEGATIVE
SARS-COV-2 RNA RESP QL NAA+PROBE: NEGATIVE

## 2024-04-08 PROCEDURE — 87637 SARSCOV2&INF A&B&RSV AMP PRB: CPT

## 2024-04-08 PROCEDURE — 99207 PR NO CHARGE NURSE ONLY: CPT

## 2024-04-08 PROCEDURE — 87651 STREP A DNA AMP PROBE: CPT

## 2024-04-08 RX ORDER — AZITHROMYCIN 250 MG/1
TABLET, FILM COATED ORAL
Qty: 6 TABLET | Refills: 1 | Status: SHIPPED | OUTPATIENT
Start: 2024-04-08 | End: 2024-04-13

## 2024-04-08 NOTE — TELEPHONE ENCOUNTER
Sent [employer] message to patient.    Jamison Tirado, Triage RN Aniyah Boyce  11:55 AM 4/8/2024

## 2024-04-08 NOTE — TELEPHONE ENCOUNTER
I Discussed  w dr Chow about her symptoms    Abx sent   She will start w zpack    Refills to be used based on symptoms since she is immunocompromised from RA tx

## 2024-04-09 ENCOUNTER — MYC MEDICAL ADVICE (OUTPATIENT)
Dept: INTERNAL MEDICINE | Facility: CLINIC | Age: 49
End: 2024-04-09
Payer: COMMERCIAL

## 2024-04-15 ENCOUNTER — TRANSFERRED RECORDS (OUTPATIENT)
Dept: HEALTH INFORMATION MANAGEMENT | Facility: CLINIC | Age: 49
End: 2024-04-15
Payer: COMMERCIAL

## 2024-04-25 ENCOUNTER — OFFICE VISIT (OUTPATIENT)
Dept: INTERNAL MEDICINE | Facility: CLINIC | Age: 49
End: 2024-04-25
Payer: COMMERCIAL

## 2024-04-25 DIAGNOSIS — M06.09 RHEUMATOID ARTHRITIS OF MULTIPLE SITES WITHOUT RHEUMATOID FACTOR (H): ICD-10-CM

## 2024-04-25 DIAGNOSIS — D32.9 MENINGIOMA (H): ICD-10-CM

## 2024-04-25 DIAGNOSIS — Z79.52 CURRENT CHRONIC USE OF SYSTEMIC STEROIDS: ICD-10-CM

## 2024-04-25 DIAGNOSIS — E78.5 HYPERLIPIDEMIA LDL GOAL <130: ICD-10-CM

## 2024-04-25 DIAGNOSIS — D84.9 IMMUNOCOMPROMISED PATIENT (H): Primary | ICD-10-CM

## 2024-04-25 DIAGNOSIS — Z02.9 ADMINISTRATIVE ENCOUNTER: ICD-10-CM

## 2024-04-25 LAB
BASOPHILS # BLD AUTO: 0.1 10E3/UL (ref 0–0.2)
BASOPHILS NFR BLD AUTO: 1 %
EOSINOPHIL # BLD AUTO: 0.1 10E3/UL (ref 0–0.7)
EOSINOPHIL NFR BLD AUTO: 1 %
ERYTHROCYTE [DISTWIDTH] IN BLOOD BY AUTOMATED COUNT: 12.7 % (ref 10–15)
ERYTHROCYTE [SEDIMENTATION RATE] IN BLOOD BY WESTERGREN METHOD: 10 MM/HR (ref 0–30)
HBA1C MFR BLD: 5.1 % (ref 0–5.6)
HCT VFR BLD AUTO: 40.6 % (ref 35–47)
HGB BLD-MCNC: 13.3 G/DL (ref 11.7–15.7)
IMM GRANULOCYTES # BLD: 0 10E3/UL
IMM GRANULOCYTES NFR BLD: 0 %
LYMPHOCYTES # BLD AUTO: 4.1 10E3/UL (ref 0.8–5.3)
LYMPHOCYTES NFR BLD AUTO: 56 %
MCH RBC QN AUTO: 27.9 PG (ref 26.5–33)
MCHC RBC AUTO-ENTMCNC: 32.8 G/DL (ref 31.5–36.5)
MCV RBC AUTO: 85 FL (ref 78–100)
MONOCYTES # BLD AUTO: 0.4 10E3/UL (ref 0–1.3)
MONOCYTES NFR BLD AUTO: 5 %
NEUTROPHILS # BLD AUTO: 2.7 10E3/UL (ref 1.6–8.3)
NEUTROPHILS NFR BLD AUTO: 37 %
NRBC # BLD AUTO: 0 10E3/UL
NRBC BLD AUTO-RTO: 0 /100
PLATELET # BLD AUTO: 310 10E3/UL (ref 150–450)
RBC # BLD AUTO: 4.76 10E6/UL (ref 3.8–5.2)
WBC # BLD AUTO: 7.3 10E3/UL (ref 4–11)

## 2024-04-25 PROCEDURE — 99214 OFFICE O/P EST MOD 30 MIN: CPT | Performed by: INTERNAL MEDICINE

## 2024-04-25 PROCEDURE — 86140 C-REACTIVE PROTEIN: CPT | Performed by: INTERNAL MEDICINE

## 2024-04-25 PROCEDURE — 84443 ASSAY THYROID STIM HORMONE: CPT | Performed by: INTERNAL MEDICINE

## 2024-04-25 PROCEDURE — 36415 COLL VENOUS BLD VENIPUNCTURE: CPT | Performed by: INTERNAL MEDICINE

## 2024-04-25 PROCEDURE — 85025 COMPLETE CBC W/AUTO DIFF WBC: CPT | Performed by: INTERNAL MEDICINE

## 2024-04-25 PROCEDURE — 83036 HEMOGLOBIN GLYCOSYLATED A1C: CPT | Performed by: INTERNAL MEDICINE

## 2024-04-25 PROCEDURE — 80053 COMPREHEN METABOLIC PANEL: CPT | Performed by: INTERNAL MEDICINE

## 2024-04-25 PROCEDURE — 85652 RBC SED RATE AUTOMATED: CPT | Performed by: INTERNAL MEDICINE

## 2024-04-25 NOTE — PROGRESS NOTES
ASSESSMENT & PLAN:                                                      (D84.9) Immunocompromised patient (H24)  (primary encounter diagnosis)  Comment:   Plan: CBC with Platelets & Differential,         Comprehensive metabolic panel, CRP         inflammation, Erythrocyte sedimentation rate         auto, Lipid panel reflex to direct LDL Fasting,        TSH with free T4 reflex            (M06.09) Rheumatoid arthritis of multiple sites without rheumatoid factor (H)  Comment: Difficult to treat  Plan: CBC with Platelets & Differential,         Comprehensive metabolic panel, CRP         inflammation, Erythrocyte sedimentation rate         auto, Lipid panel reflex to direct LDL Fasting,        TSH with free T4 reflex  Follow-up with rheumatology            (E78.5) Hyperlipidemia LDL goal <130  Comment:   Plan: CBC with Platelets & Differential,         Comprehensive metabolic panel, CRP         inflammation, Erythrocyte sedimentation rate         auto, Lipid panel reflex to direct LDL Fasting,        TSH with free T4 reflex            (D32.9) Meningioma (H)  Comment: No symptoms  Plan: MR Brain w/o & w Contrast            (Z79.52) Current chronic use of systemic steroids  Comment:   Plan: Calcium and vitamin D    (Z02.9) Administrative encounter  Comment:   Plan: FMLA forms       Chief Complaint:                                                        F/up with RA    SUBJECTIVE:                                                    History of present illness     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  Unfortunately her RA is challenging not responding to classic treatment.   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  Then she was tried on Embrel and Humirabut the RA didn't respond to them.  She is taking now Xelganx. She tolerates it but she had to stop it because an infection - ac  sinusitis     On 2024, while she was at work she developed fever. She was prescribed Abx but it took her 2 weeks to recover. She had acute sinusitis. During the infections she has to stop the Xelganx which lead to RA flare up. She resumed Xelganx on April 15 and the RA got more manageable on . She was able to return to work on .   She still has joints symptoms: L elbow and L ankle pain last night.         ROS:                                                      ROS: negative for fever, chills, cough, wheezes, chest pain, shortness of breath, vomiting, abdominal pain, leg swelling         OBJECTIVE:                                                    Physical Exam :    not currently breastfeeding.   NAD, appears comfortable  Skin: no rashes   Joints: No wrist swelling.  Questionable ulnar deviation of the fingers  Neurologic: A, Ox3, no focal signs appreciated    PMHx: reviewed  Past Medical History:   Diagnosis Date    Hypogonadotropic hypogonadism (H)       PSHx: reviewed  Past Surgical History:   Procedure Laterality Date     SECTION          Meds: reviewed  Current Outpatient Medications   Medication Sig Dispense Refill    amoxicillin-clavulanate (AUGMENTIN) 875-125 MG tablet Take 1 tablet by mouth 2 times daily 20 tablet 1    atovaquone-proguanil (MALARONE) 250-100 MG tablet Take 1 tablet by mouth daily Start 2 days before travel and continue 7 days after return. 40 tablet 0    diclofenac (VOLTAREN) 1 % topical gel Apply 4 grams to the knees and 2 grams on the wrists/hands 3-4 times a day as needed 3600 g 1    fluticasone (FLONASE) 50 MCG/ACT nasal spray Spray 1 spray into both nostrils daily 16 g 11    folic acid (FOLVITE) 1 MG tablet Take 1 mg by mouth daily      ipratropium (ATROVENT) 0.06 % nasal spray Spray 2 sprays into both nostrils 4 times daily 15 mL 4    METHOTREXate, PF, (OTREXUP) 15 MG/0.4ML pen Inject 15 mg Subcutaneous every 7 days      ondansetron (ZOFRAN ODT) 8 MG  ODT tab Take 1 tablet (8 mg) by mouth every 8 hours as needed for nausea 30 tablet 0    predniSONE (DELTASONE) 5 MG tablet Take 1 tablet (5 mg) by mouth daily 30 tablet 1    vitamin D3 (CHOLECALCIFEROL) 1.25 MG (82442 UT) capsule Take 1 capsule (50,000 Units) by mouth every 7 days 12 capsule 1       Soc Hx: reviewed  Fam Hx: reviewed      Chart documentation was completed, in part, with [employer] voice-recognition software. Even though reviewed, some grammatical, spelling, and word errors may remain.      Latasha Jama MD  Internal Medicine     Answers submitted by the patient for this visit:  General Questionnaire (Submitted on 4/25/2024)  Chief Complaint: Chronic problems general questions HPI Form  What is the reason for your visit today? : Seronegative RA  How many servings of fruits and vegetables do you eat daily?: 2-3  On average, how many sweetened beverages do you drink each day (Examples: soda, juice, sweet tea, etc.  Do NOT count diet or artificially sweetened beverages)?: 0  How many minutes a day do you exercise enough to make your heart beat faster?: 9 or less  How many days a week do you exercise enough to make your heart beat faster?: 3 or less  How many days per week do you miss taking your medication?: 0

## 2024-04-26 LAB
ALBUMIN SERPL BCG-MCNC: 4.4 G/DL (ref 3.5–5.2)
ALP SERPL-CCNC: 77 U/L (ref 40–150)
ALT SERPL W P-5'-P-CCNC: 23 U/L (ref 0–50)
ANION GAP SERPL CALCULATED.3IONS-SCNC: 12 MMOL/L (ref 7–15)
AST SERPL W P-5'-P-CCNC: 27 U/L (ref 0–45)
BILIRUB SERPL-MCNC: 0.2 MG/DL
BUN SERPL-MCNC: 12.7 MG/DL (ref 6–20)
CALCIUM SERPL-MCNC: 9.3 MG/DL (ref 8.6–10)
CHLORIDE SERPL-SCNC: 104 MMOL/L (ref 98–107)
CREAT SERPL-MCNC: 0.84 MG/DL (ref 0.51–0.95)
CRP SERPL-MCNC: <3 MG/L
DEPRECATED HCO3 PLAS-SCNC: 24 MMOL/L (ref 22–29)
EGFRCR SERPLBLD CKD-EPI 2021: 84 ML/MIN/1.73M2
GLUCOSE SERPL-MCNC: 92 MG/DL (ref 70–99)
POTASSIUM SERPL-SCNC: 4.3 MMOL/L (ref 3.4–5.3)
PROT SERPL-MCNC: 7.4 G/DL (ref 6.4–8.3)
SODIUM SERPL-SCNC: 140 MMOL/L (ref 135–145)
TSH SERPL DL<=0.005 MIU/L-ACNC: 2 UIU/ML (ref 0.3–4.2)

## 2024-05-10 ENCOUNTER — LAB (OUTPATIENT)
Dept: LAB | Facility: CLINIC | Age: 49
End: 2024-05-10
Payer: COMMERCIAL

## 2024-05-10 DIAGNOSIS — E78.5 HYPERLIPIDEMIA LDL GOAL <130: ICD-10-CM

## 2024-05-10 DIAGNOSIS — D84.9 IMMUNOCOMPROMISED PATIENT (H): ICD-10-CM

## 2024-05-10 DIAGNOSIS — M06.09 RHEUMATOID ARTHRITIS OF MULTIPLE SITES WITHOUT RHEUMATOID FACTOR (H): ICD-10-CM

## 2024-05-10 LAB
CHOLEST SERPL-MCNC: 284 MG/DL
FASTING STATUS PATIENT QL REPORTED: YES
HDLC SERPL-MCNC: 40 MG/DL
LDLC SERPL CALC-MCNC: 201 MG/DL
NONHDLC SERPL-MCNC: 244 MG/DL
TRIGL SERPL-MCNC: 217 MG/DL

## 2024-05-10 PROCEDURE — 36415 COLL VENOUS BLD VENIPUNCTURE: CPT

## 2024-05-10 PROCEDURE — 80061 LIPID PANEL: CPT

## 2024-05-15 ENCOUNTER — MYC MEDICAL ADVICE (OUTPATIENT)
Dept: INTERNAL MEDICINE | Facility: CLINIC | Age: 49
End: 2024-05-15
Payer: COMMERCIAL

## 2024-05-15 DIAGNOSIS — E78.5 HYPERLIPIDEMIA LDL GOAL <100: Primary | ICD-10-CM

## 2024-05-15 RX ORDER — ROSUVASTATIN CALCIUM 20 MG/1
20 TABLET, COATED ORAL DAILY
Qty: 90 TABLET | Refills: 4 | Status: SHIPPED | OUTPATIENT
Start: 2024-05-15

## 2024-06-11 ENCOUNTER — LAB (OUTPATIENT)
Dept: LAB | Facility: CLINIC | Age: 49
End: 2024-06-11
Payer: COMMERCIAL

## 2024-06-11 DIAGNOSIS — E78.5 HYPERLIPIDEMIA LDL GOAL <100: ICD-10-CM

## 2024-06-11 PROCEDURE — 84460 ALANINE AMINO (ALT) (SGPT): CPT

## 2024-06-11 PROCEDURE — 84450 TRANSFERASE (AST) (SGOT): CPT

## 2024-06-11 PROCEDURE — 36415 COLL VENOUS BLD VENIPUNCTURE: CPT

## 2024-06-12 LAB
ALT SERPL W P-5'-P-CCNC: 25 U/L (ref 0–50)
AST SERPL W P-5'-P-CCNC: 26 U/L (ref 0–45)

## 2024-07-01 ENCOUNTER — TRANSFERRED RECORDS (OUTPATIENT)
Dept: HEALTH INFORMATION MANAGEMENT | Facility: CLINIC | Age: 49
End: 2024-07-01
Payer: COMMERCIAL

## 2024-07-03 DIAGNOSIS — D84.9 IMMUNOCOMPROMISED PATIENT (H): ICD-10-CM

## 2024-07-03 DIAGNOSIS — E78.5 HYPERLIPIDEMIA LDL GOAL <100: ICD-10-CM

## 2024-07-03 DIAGNOSIS — Z13.6 SCREENING FOR ISCHEMIC HEART DISEASE: Primary | ICD-10-CM

## 2024-07-03 DIAGNOSIS — M06.09 RHEUMATOID ARTHRITIS OF MULTIPLE SITES WITHOUT RHEUMATOID FACTOR (H): ICD-10-CM

## 2024-07-08 ENCOUNTER — LAB (OUTPATIENT)
Dept: LAB | Facility: CLINIC | Age: 49
End: 2024-07-08
Payer: COMMERCIAL

## 2024-07-08 DIAGNOSIS — E78.5 HYPERLIPIDEMIA LDL GOAL <100: ICD-10-CM

## 2024-07-08 DIAGNOSIS — D84.9 IMMUNOCOMPROMISED PATIENT (H): ICD-10-CM

## 2024-07-08 DIAGNOSIS — M06.09 RHEUMATOID ARTHRITIS OF MULTIPLE SITES WITHOUT RHEUMATOID FACTOR (H): ICD-10-CM

## 2024-07-08 DIAGNOSIS — Z13.6 SCREENING FOR ISCHEMIC HEART DISEASE: ICD-10-CM

## 2024-07-08 LAB
ALBUMIN SERPL BCG-MCNC: 4.5 G/DL (ref 3.5–5.2)
ALP SERPL-CCNC: 70 U/L (ref 40–150)
ALT SERPL W P-5'-P-CCNC: 21 U/L (ref 0–50)
ANION GAP SERPL CALCULATED.3IONS-SCNC: 9 MMOL/L (ref 7–15)
AST SERPL W P-5'-P-CCNC: 29 U/L (ref 0–45)
BASOPHILS # BLD AUTO: 0.1 10E3/UL (ref 0–0.2)
BASOPHILS NFR BLD AUTO: 1 %
BILIRUB SERPL-MCNC: 0.3 MG/DL
BUN SERPL-MCNC: 20.1 MG/DL (ref 6–20)
CALCIUM SERPL-MCNC: 9.2 MG/DL (ref 8.6–10)
CHLORIDE SERPL-SCNC: 108 MMOL/L (ref 98–107)
CHOLEST SERPL-MCNC: 155 MG/DL
CREAT SERPL-MCNC: 0.9 MG/DL (ref 0.51–0.95)
CRP SERPL-MCNC: <3 MG/L
DEPRECATED HCO3 PLAS-SCNC: 25 MMOL/L (ref 22–29)
EGFRCR SERPLBLD CKD-EPI 2021: 78 ML/MIN/1.73M2
EOSINOPHIL # BLD AUTO: 0.1 10E3/UL (ref 0–0.7)
EOSINOPHIL NFR BLD AUTO: 1 %
ERYTHROCYTE [DISTWIDTH] IN BLOOD BY AUTOMATED COUNT: 13.5 % (ref 10–15)
ERYTHROCYTE [SEDIMENTATION RATE] IN BLOOD BY WESTERGREN METHOD: 8 MM/HR (ref 0–30)
FASTING STATUS PATIENT QL REPORTED: YES
FASTING STATUS PATIENT QL REPORTED: YES
GLUCOSE SERPL-MCNC: 108 MG/DL (ref 70–99)
HCT VFR BLD AUTO: 41.2 % (ref 35–47)
HDLC SERPL-MCNC: 51 MG/DL
HGB BLD-MCNC: 13.6 G/DL (ref 11.7–15.7)
IMM GRANULOCYTES # BLD: 0 10E3/UL
IMM GRANULOCYTES NFR BLD: 0 %
LDLC SERPL CALC-MCNC: 88 MG/DL
LYMPHOCYTES # BLD AUTO: 3.7 10E3/UL (ref 0.8–5.3)
LYMPHOCYTES NFR BLD AUTO: 52 %
MCH RBC QN AUTO: 28.5 PG (ref 26.5–33)
MCHC RBC AUTO-ENTMCNC: 33 G/DL (ref 31.5–36.5)
MCV RBC AUTO: 86 FL (ref 78–100)
MONOCYTES # BLD AUTO: 0.5 10E3/UL (ref 0–1.3)
MONOCYTES NFR BLD AUTO: 6 %
NEUTROPHILS # BLD AUTO: 2.8 10E3/UL (ref 1.6–8.3)
NEUTROPHILS NFR BLD AUTO: 39 %
NONHDLC SERPL-MCNC: 104 MG/DL
NRBC # BLD AUTO: 0 10E3/UL
NRBC BLD AUTO-RTO: 0 /100
PLATELET # BLD AUTO: 271 10E3/UL (ref 150–450)
POTASSIUM SERPL-SCNC: 4.2 MMOL/L (ref 3.4–5.3)
PROT SERPL-MCNC: 7.3 G/DL (ref 6.4–8.3)
RBC # BLD AUTO: 4.78 10E6/UL (ref 3.8–5.2)
SODIUM SERPL-SCNC: 142 MMOL/L (ref 135–145)
TRIGL SERPL-MCNC: 82 MG/DL
WBC # BLD AUTO: 7.1 10E3/UL (ref 4–11)

## 2024-07-08 PROCEDURE — 85652 RBC SED RATE AUTOMATED: CPT

## 2024-07-08 PROCEDURE — 86140 C-REACTIVE PROTEIN: CPT

## 2024-07-08 PROCEDURE — 80061 LIPID PANEL: CPT

## 2024-07-08 PROCEDURE — 85025 COMPLETE CBC W/AUTO DIFF WBC: CPT

## 2024-07-08 PROCEDURE — 80053 COMPREHEN METABOLIC PANEL: CPT

## 2024-07-08 PROCEDURE — 36415 COLL VENOUS BLD VENIPUNCTURE: CPT

## 2024-07-17 ENCOUNTER — TRANSFERRED RECORDS (OUTPATIENT)
Dept: HEALTH INFORMATION MANAGEMENT | Facility: CLINIC | Age: 49
End: 2024-07-17
Payer: COMMERCIAL

## 2024-07-22 ENCOUNTER — TELEPHONE (OUTPATIENT)
Dept: INTERNAL MEDICINE | Facility: CLINIC | Age: 49
End: 2024-07-22
Payer: COMMERCIAL

## 2024-07-22 DIAGNOSIS — E55.9 VITAMIN D DEFICIENCY: ICD-10-CM

## 2024-07-22 DIAGNOSIS — D84.9 IMMUNOCOMPROMISED PATIENT (H): ICD-10-CM

## 2024-07-22 DIAGNOSIS — M06.4 POLYARTHRITIS, INFLAMMATORY (H): ICD-10-CM

## 2024-07-22 DIAGNOSIS — Z78.9 PATIENT TRAVELS: Primary | ICD-10-CM

## 2024-07-22 RX ORDER — CEFDINIR 300 MG/1
300 CAPSULE ORAL 2 TIMES DAILY
Qty: 14 CAPSULE | Refills: 1 | Status: SHIPPED | OUTPATIENT
Start: 2024-07-22

## 2024-07-22 RX ORDER — CEFUROXIME AXETIL 500 MG/1
500 TABLET ORAL 2 TIMES DAILY
Qty: 14 TABLET | Refills: 1 | Status: SHIPPED | OUTPATIENT
Start: 2024-07-22

## 2024-07-22 RX ORDER — SCOLOPAMINE TRANSDERMAL SYSTEM 1 MG/1
1 PATCH, EXTENDED RELEASE TRANSDERMAL
Qty: 10 PATCH | Refills: 1 | Status: SHIPPED | OUTPATIENT
Start: 2024-07-22

## 2024-07-22 RX ORDER — AZITHROMYCIN 250 MG/1
500 TABLET, FILM COATED ORAL DAILY
Qty: 12 TABLET | Refills: 0 | Status: SHIPPED | OUTPATIENT
Start: 2024-07-22 | End: 2024-07-28

## 2024-07-22 RX ORDER — ONDANSETRON 4 MG/1
4 TABLET, ORALLY DISINTEGRATING ORAL EVERY 8 HOURS PRN
Qty: 30 TABLET | Refills: 1 | Status: SHIPPED | OUTPATIENT
Start: 2024-07-22

## 2024-07-22 RX ORDER — SULFAMETHOXAZOLE/TRIMETHOPRIM 800-160 MG
1 TABLET ORAL 2 TIMES DAILY
Qty: 14 TABLET | Refills: 1 | Status: SHIPPED | OUTPATIENT
Start: 2024-07-22

## 2024-07-22 RX ORDER — PREDNISONE 5 MG/1
5 TABLET ORAL DAILY
Qty: 30 TABLET | Refills: 1 | Status: SHIPPED | OUTPATIENT
Start: 2024-07-22

## 2024-07-23 RX ORDER — CHOLECALCIFEROL (VITAMIN D3) 1250 MCG
1250 CAPSULE ORAL
Qty: 12 CAPSULE | Refills: 1 | Status: SHIPPED | OUTPATIENT
Start: 2024-07-23

## 2024-10-02 ENCOUNTER — TRANSFERRED RECORDS (OUTPATIENT)
Dept: HEALTH INFORMATION MANAGEMENT | Facility: CLINIC | Age: 49
End: 2024-10-02
Payer: COMMERCIAL

## 2024-10-10 ENCOUNTER — TELEPHONE (OUTPATIENT)
Dept: INTERNAL MEDICINE | Facility: CLINIC | Age: 49
End: 2024-10-10
Payer: COMMERCIAL

## 2024-10-10 DIAGNOSIS — R06.09 DOE (DYSPNEA ON EXERTION): Primary | ICD-10-CM

## 2024-10-10 DIAGNOSIS — M06.09 RHEUMATOID ARTHRITIS OF MULTIPLE SITES WITHOUT RHEUMATOID FACTOR (H): ICD-10-CM

## 2024-10-10 NOTE — TELEPHONE ENCOUNTER
----- Message from Trinity Chow sent at 10/10/2024  6:59 AM CDT -----  I wanted to reach out for a few issues   1. Proximal muscle aches and weakness and elevated ck at rheumatology clinic. I stopped statins.  2. I Have  progressive  3. My repeat 6 months MRI is due at [facility]  4. Weight gain issues with new meds and lack of activity

## 2024-10-31 ENCOUNTER — HOSPITAL ENCOUNTER (OUTPATIENT)
Dept: CARDIOLOGY | Facility: CLINIC | Age: 49
Discharge: HOME | End: 2024-10-31
Attending: INTERNAL MEDICINE
Payer: COMMERCIAL

## 2024-10-31 ENCOUNTER — TRANSFERRED RECORDS (OUTPATIENT)
Dept: HEALTH INFORMATION MANAGEMENT | Facility: CLINIC | Age: 49
End: 2024-10-31

## 2024-10-31 VITALS
OXYGEN SATURATION: 99 % | SYSTOLIC BLOOD PRESSURE: 104 MMHG | HEIGHT: 66 IN | WEIGHT: 165.2 LBS | HEART RATE: 71 BPM | BODY MASS INDEX: 26.55 KG/M2 | DIASTOLIC BLOOD PRESSURE: 70 MMHG

## 2024-10-31 VITALS — DIASTOLIC BLOOD PRESSURE: 80 MMHG | HEART RATE: 85 BPM | SYSTOLIC BLOOD PRESSURE: 110 MMHG

## 2024-10-31 DIAGNOSIS — R06.09 DOE (DYSPNEA ON EXERTION): ICD-10-CM

## 2024-10-31 LAB
CV STRESS MAX HR HE: 99
NUC STRESS EJECTION FRACTION: 66 %
RATE PRESSURE PRODUCT: NORMAL
STRESS ECHO BASELINE DIASTOLIC HE: 80
STRESS ECHO BASELINE HR: 75 BPM
STRESS ECHO BASELINE SYSTOLIC BP: 110
STRESS ECHO CALCULATED PERCENT HR: 59 %
STRESS ECHO LAST STRESS DIASTOLIC BP: 72
STRESS ECHO LAST STRESS SYSTOLIC BP: 128
STRESS ECHO TARGET HR: 169

## 2024-10-31 PROCEDURE — A9502 TC99M TETROFOSMIN: HCPCS | Performed by: INTERNAL MEDICINE

## 2024-10-31 PROCEDURE — 78452 HT MUSCLE IMAGE SPECT MULT: CPT | Mod: 26 | Performed by: INTERNAL MEDICINE

## 2024-10-31 PROCEDURE — 250N000011 HC RX IP 250 OP 636: Performed by: INTERNAL MEDICINE

## 2024-10-31 PROCEDURE — 343N000001 HC RX 343 MED OP 636: Performed by: INTERNAL MEDICINE

## 2024-10-31 PROCEDURE — 93017 CV STRESS TEST TRACING ONLY: CPT

## 2024-10-31 PROCEDURE — 93016 CV STRESS TEST SUPVJ ONLY: CPT

## 2024-10-31 PROCEDURE — 93018 CV STRESS TEST I&R ONLY: CPT | Performed by: INTERNAL MEDICINE

## 2024-10-31 RX ORDER — DIAZEPAM 5 MG/1
5 TABLET ORAL EVERY 30 MIN PRN
Status: DISCONTINUED | OUTPATIENT
Start: 2024-10-31 | End: 2024-11-01 | Stop reason: HOSPADM

## 2024-10-31 RX ORDER — LIDOCAINE 40 MG/G
CREAM TOPICAL
Status: DISCONTINUED | OUTPATIENT
Start: 2024-10-31 | End: 2024-11-01 | Stop reason: HOSPADM

## 2024-10-31 RX ORDER — ALBUTEROL SULFATE 90 UG/1
2 INHALANT RESPIRATORY (INHALATION) EVERY 5 MIN PRN
Status: DISCONTINUED | OUTPATIENT
Start: 2024-10-31 | End: 2024-11-01 | Stop reason: HOSPADM

## 2024-10-31 RX ORDER — REGADENOSON 0.08 MG/ML
0.4 INJECTION, SOLUTION INTRAVENOUS ONCE
Status: COMPLETED | OUTPATIENT
Start: 2024-10-31 | End: 2024-10-31

## 2024-10-31 RX ORDER — LORAZEPAM 0.5 MG/1
0.5 TABLET ORAL EVERY 30 MIN PRN
Status: DISCONTINUED | OUTPATIENT
Start: 2024-10-31 | End: 2024-11-01 | Stop reason: HOSPADM

## 2024-10-31 RX ORDER — CAFFEINE CITRATE 20 MG/ML
60 SOLUTION INTRAVENOUS
Status: DISCONTINUED | OUTPATIENT
Start: 2024-10-31 | End: 2024-10-31 | Stop reason: HOSPADM

## 2024-10-31 RX ORDER — AMINOPHYLLINE 25 MG/ML
50-100 INJECTION, SOLUTION INTRAVENOUS
Status: DISCONTINUED | OUTPATIENT
Start: 2024-10-31 | End: 2024-11-01 | Stop reason: HOSPADM

## 2024-10-31 RX ORDER — CAFFEINE 200 MG
200 TABLET ORAL
Status: DISCONTINUED | OUTPATIENT
Start: 2024-10-31 | End: 2024-10-31 | Stop reason: HOSPADM

## 2024-10-31 RX ORDER — NITROGLYCERIN 0.4 MG/1
0.4 TABLET SUBLINGUAL EVERY 5 MIN PRN
Status: DISCONTINUED | OUTPATIENT
Start: 2024-10-31 | End: 2024-10-31 | Stop reason: HOSPADM

## 2024-10-31 RX ORDER — DIPHENHYDRAMINE HYDROCHLORIDE 50 MG/ML
25-50 INJECTION INTRAMUSCULAR; INTRAVENOUS
Status: DISCONTINUED | OUTPATIENT
Start: 2024-10-31 | End: 2024-11-01 | Stop reason: HOSPADM

## 2024-10-31 RX ORDER — DIPHENHYDRAMINE HCL 25 MG
25 CAPSULE ORAL
Status: DISCONTINUED | OUTPATIENT
Start: 2024-10-31 | End: 2024-11-01 | Stop reason: HOSPADM

## 2024-10-31 RX ORDER — METHYLPREDNISOLONE SODIUM SUCCINATE 125 MG/2ML
125 INJECTION INTRAMUSCULAR; INTRAVENOUS
Status: DISCONTINUED | OUTPATIENT
Start: 2024-10-31 | End: 2024-11-01 | Stop reason: HOSPADM

## 2024-10-31 RX ADMIN — TETROFOSMIN 9.54 MILLICURIE: 1.38 INJECTION, POWDER, LYOPHILIZED, FOR SOLUTION INTRAVENOUS at 10:28

## 2024-10-31 RX ADMIN — TETROFOSMIN 3.23 MILLICURIE: 1.38 INJECTION, POWDER, LYOPHILIZED, FOR SOLUTION INTRAVENOUS at 08:45

## 2024-10-31 RX ADMIN — REGADENOSON 0.4 MG: 0.08 INJECTION, SOLUTION INTRAVENOUS at 10:24

## 2024-11-01 ENCOUNTER — TELEPHONE (OUTPATIENT)
Dept: CARDIOLOGY | Facility: CLINIC | Age: 49
End: 2024-11-01
Payer: COMMERCIAL

## 2024-11-01 DIAGNOSIS — R93.1 ABNORMAL NUCLEAR CARDIAC IMAGING TEST: Primary | ICD-10-CM

## 2024-11-01 NOTE — TELEPHONE ENCOUNTER
Team received request from Dr. 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  Her YOB: 1975.  I am okay with any time convenient to her.    Writer called and spoke with Dr. Chow.  We mutually agreed to 0900 for her appointment, with confirmed arrival here at 0850.    Writer sent  team request.    Tatiana Madrid RN on 11/1/2024 at 10:36 AM

## 2024-11-05 ENCOUNTER — OFFICE VISIT (OUTPATIENT)
Dept: CARDIOLOGY | Facility: CLINIC | Age: 49
End: 2024-11-05
Payer: COMMERCIAL

## 2024-11-05 VITALS
HEART RATE: 77 BPM | WEIGHT: 165.8 LBS | HEIGHT: 68 IN | OXYGEN SATURATION: 97 % | SYSTOLIC BLOOD PRESSURE: 97 MMHG | BODY MASS INDEX: 25.13 KG/M2 | DIASTOLIC BLOOD PRESSURE: 76 MMHG

## 2024-11-05 DIAGNOSIS — R93.1 ABNORMAL NUCLEAR CARDIAC IMAGING TEST: ICD-10-CM

## 2024-11-05 DIAGNOSIS — R06.09 DOE (DYSPNEA ON EXERTION): ICD-10-CM

## 2024-11-05 DIAGNOSIS — R94.39 ABNORMAL CARDIOVASCULAR STRESS TEST: Primary | ICD-10-CM

## 2024-11-05 PROCEDURE — 99204 OFFICE O/P NEW MOD 45 MIN: CPT | Performed by: INTERNAL MEDICINE

## 2024-11-05 RX ORDER — TOFACITINIB 11 MG/1
11 TABLET, FILM COATED, EXTENDED RELEASE ORAL DAILY
COMMUNITY
Start: 2024-10-14

## 2024-11-05 RX ORDER — ASPIRIN 81 MG/1
81 TABLET ORAL DAILY
Qty: 90 TABLET | Refills: 3 | Status: SHIPPED | OUTPATIENT
Start: 2024-11-05

## 2024-11-05 NOTE — PROGRESS NOTES
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  This was Lexiscan stress test that showed small distal septal ischemia although overall he was felt to be slightly reduced because of concern about potential breast attenuation artifact.  Stress test was done because patient was having dyspnea on exertion.  She has been struggling with arthritis pain related to rheumatoid arthritis and had not done any dedicated exercise program for last several months to few years and now the arthritis is well-controlled she is planning to become physically active more again doing dedicated exercise.  She has also noticed that climbing stairs like about 5 or 6 flights of stairs she will become very short of breath.  No exertional chest pain prior to the stress test.  Following the results of the stress test patient tells me that she became little anxious and may have noticed some exertional related chest discomfort once.  She was on Crestor 20 mg daily but this was discontinued a few months ago because of symptomatic myalgias and also elevated CK.  LDL was 88 when she was on statin.  No bleeding issues.  Her baseline EKG at the time of stress test shows sinus rhythm with T wave inversion in lead aVR and aVL.  She does not have any known diabetes or hypertension.  There is family history of femoral hypercholesterolemia but her no known history of premature coronary disease.    Assessment and plan  Possible CAD on the basis of abnormal stress test showing small area of mild distal septal ischemia.  Stress test images personally reviewed agree with reader that they could be potential breast attenuation artifact although defect is only seen on stress images.  Discussed sensitivity and specifically of stress test with patient.  We discussed option of  cardiac MRI stress perfusion, traditional coronary angiogram versus coronary CT angiogram.  We discussed pros and cons of each strategy.  We both made a mutually shared decision to proceed with coronary CT angiogram.  Meanwhile I think it is reasonable to resume that patient has some CAD and I recommend aspirin 81 mg daily.  Given that she had elevated CK and symptomatic myalgias with statin I recommend using PCSK9 inhibitor like Repatha 140 mg subcutaneously every 2 weeks.  Common side effects were discussed with patient.  She is agreeable.  Dyspnea on exertion.  This could be due to underlying deconditioning, and anginal Main cannot be completely excluded.  Cardiac auscultation was benign today.  I recommend echocardiogram in addition to coronary CT angiogram as noted above.  Rheumatoid arthritis on Xeljanz  Familial hypercholesterolemia LDL was 88 when she was on statin but could not continue statin for elevated CK and symptomatic myalgias.  Now being replaced with PCSK9 inhibitor.    Recommendations  Aspirin 81 mg daily  Repatha 140 mg subcutaneously every 2 weeks  Coronary CT angiogram  Echocardiogram  My clinic is going to update her with the results of these test and I will see her subsequently following this test, timing will depend upon the results.    Orders Placed This Encounter   Procedures    Lipid Profile    ALT    Echocardiogram Complete       Orders Placed This Encounter   Medications    XELJANZ XR 11 MG 24 hr tablet     Si mg daily.    aspirin 81 MG EC tablet     Sig: Take 1 tablet (81 mg) by mouth daily.     Dispense:  90 tablet     Refill:  3    evolocumab (REPATHA) 140 MG/ML prefilled autoinjector     Sig: Inject 1 mL (140 mg) subcutaneously every 14 days.     Dispense:  6 mL     Refill:  3       Medications Discontinued During This Encounter   Medication Reason    rosuvastatin (CRESTOR) 20 MG tablet Stopped by Patient (No AVS)         Encounter Diagnoses   Name Primary?    Abnormal  cardiovascular stress test Yes    Abnormal nuclear cardiac imaging test      (dyspnea on exertion)        CURRENT MEDICATIONS:  Current Outpatient Medications   Medication Sig Dispense Refill    aspirin 81 MG EC tablet Take 1 tablet (81 mg) by mouth daily. 90 tablet 3    cholecalciferol (VITAMIN D3) 1250 mcg (64091 units) capsule TAKE 1 CAPSULE (50,000 UNITS) BY MOUTH EVERY 7 DAYS 12 capsule 1    diclofenac (VOLTAREN) 1 % topical gel Apply 4 grams to the knees and 2 grams on the wrists/hands 3-4 times a day as needed 3600 g 1    evolocumab (REPATHA) 140 MG/ML prefilled autoinjector Inject 1 mL (140 mg) subcutaneously every 14 days. 6 mL 3    fluticasone (FLONASE) 50 MCG/ACT nasal spray Spray 1 spray into both nostrils daily 16 g 11    scopolamine (TRANSDERM) 1 MG/3DAYS 72 hr patch Place 1 patch onto the skin every 72 hours 10 patch 1    XELJANZ XR 11 MG 24 hr tablet 11 mg daily.      amoxicillin-clavulanate (AUGMENTIN) 875-125 MG tablet Take 1 tablet by mouth 2 times daily (Patient not taking: Reported on 11/5/2024) 20 tablet 1    cefdinir (OMNICEF) 300 MG capsule Take 1 capsule (300 mg) by mouth 2 times daily (Patient not taking: Reported on 11/5/2024) 14 capsule 1    cefuroxime (CEFTIN) 500 MG tablet Take 1 tablet (500 mg) by mouth 2 times daily (Patient not taking: Reported on 11/5/2024) 14 tablet 1    folic acid (FOLVITE) 1 MG tablet Take 1 mg by mouth daily (Patient not taking: Reported on 11/5/2024)      ipratropium (ATROVENT) 0.06 % nasal spray Spray 2 sprays into both nostrils 4 times daily (Patient not taking: Reported on 11/5/2024) 15 mL 4    ondansetron (ZOFRAN ODT) 4 MG ODT tab Take 1 tablet (4 mg) by mouth every 8 hours as needed for nausea (Patient not taking: Reported on 11/5/2024) 30 tablet 1    ondansetron (ZOFRAN ODT) 8 MG ODT tab Take 1 tablet (8 mg) by mouth every 8 hours as needed for nausea (Patient not taking: Reported on 11/5/2024) 30 tablet 0    predniSONE (DELTASONE) 5 MG tablet Take 1  "tablet (5 mg) by mouth daily (Patient not taking: Reported on 2024) 30 tablet 1    sulfamethoxazole-trimethoprim (BACTRIM DS) 800-160 MG tablet Take 1 tablet by mouth 2 times daily (Patient not taking: Reported on 2024) 14 tablet 1       ALLERGIES   No Known Allergies    PAST MEDICAL HISTORY:  Past Medical History:   Diagnosis Date    Hypogonadotropic hypogonadism (H)        PAST SURGICAL HISTORY:  Past Surgical History:   Procedure Laterality Date     SECTION         FAMILY HISTORY:  Family History   Problem Relation Age of Onset    Thyroid Disease Mother     Hyperlipidemia Mother     Hypertension Mother     Hypertension Father     Diabetes Maternal Grandmother     Cerebral aneurysm Maternal Grandfather         or tumor    Hyperlipidemia Sister        SOCIAL HISTORY:  Social History     Socioeconomic History    Marital status:      Spouse name: None    Number of children: None    Years of education: None    Highest education level: None   Tobacco Use    Smoking status: Never     Passive exposure: Never    Smokeless tobacco: Never   Vaping Use    Vaping status: Never Used   Substance and Sexual Activity    Alcohol use: Not Currently    Drug use: Never    Sexual activity: Yes     Partners: Male       Review of Systems:  Skin:          Eyes:         ENT:         Respiratory:  Positive for dyspnea on exertion     Cardiovascular:  Negative;syncope or near-syncope;cyanosis;lightheadedness;dizziness chest pain;Positive for;edema energy level decreasing  Gastroenterology:        Genitourinary:         Musculoskeletal:  Positive for joint pain    Neurologic:         Psychiatric:         Heme/Lymph/Imm:         Endocrine:           Physical Exam:  Vitals: BP 97/76   Pulse 77   Ht 1.715 m (5' 7.5\")   Wt 75.2 kg (165 lb 12.8 oz)   SpO2 97%   BMI 25.58 kg/m      General Patient appears comfortable  Neck normal JVP, no bruit  Cardiovascular system S1-S2 normal no murmur rub or gallop  Respiratory " system clear to auscultation  Extremities no edema      CC  Latasha Bowie MD  [address] [place] [address]

## 2024-11-05 NOTE — LETTER
11/5/2024    Latasha Bowie MD  [address] [facility] [address]    RE: Trinity Romanoestuardo       Dear Colleague,     I had the pleasure of seeing Trinity Chow in the [facility].  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  This was Lexiscan stress test that showed small distal septal ischemia although overall he was felt to be slightly reduced because of concern about potential breast attenuation artifact.  Stress test was done because patient was having dyspnea on exertion.  She has been struggling with arthritis pain related to rheumatoid arthritis and had not done any dedicated exercise program for last several months to few years and now the arthritis is well-controlled she is planning to become physically active more again doing dedicated exercise.  She has also noticed that climbing stairs like about 5 or 6 flights of stairs she will become very short of breath.  No exertional chest pain prior to the stress test.  Following the results of the stress test patient tells me that she became little anxious and may have noticed some exertional related chest discomfort once.  She was on Crestor 20 mg daily but this was discontinued a few months ago because of symptomatic myalgias and also elevated CK.  LDL was 88 when she was on statin.  No bleeding issues.  Her baseline EKG at the time of stress test shows sinus rhythm with T wave inversion in lead aVR and aVL.  She does not have any known diabetes or hypertension.  There is family history of femoral hypercholesterolemia but her no known history of premature coronary disease.    Assessment and plan  Possible CAD on the basis of abnormal stress test showing small area of mild distal septal ischemia.  Stress test images personally  reviewed agree with reader that they could be potential breast attenuation artifact although defect is only seen on stress images.  Discussed sensitivity and specifically of stress test with patient.  We discussed option of cardiac MRI stress perfusion, traditional coronary angiogram versus coronary CT angiogram.  We discussed pros and cons of each strategy.  We both made a mutually shared decision to proceed with coronary CT angiogram.  Meanwhile I think it is reasonable to resume that patient has some CAD and I recommend aspirin 81 mg daily.  Given that she had elevated CK and symptomatic myalgias with statin I recommend using PCSK9 inhibitor like Repatha 140 mg subcutaneously every 2 weeks.  Common side effects were discussed with patient.  She is agreeable.  Dyspnea on exertion.  This could be due to underlying deconditioning, and anginal Main cannot be completely excluded.  Cardiac auscultation was benign today.  I recommend echocardiogram in addition to coronary CT angiogram as noted above.  Rheumatoid arthritis on Xeljanz  Familial hypercholesterolemia LDL was 88 when she was on statin but could not continue statin for elevated CK and symptomatic myalgias.  Now being replaced with PCSK9 inhibitor.    Recommendations  Aspirin 81 mg daily  Repatha 140 mg subcutaneously every 2 weeks  Coronary CT angiogram  Echocardiogram  My clinic is going to update her with the results of these test and I will see her subsequently following this test, timing will depend upon the results.    Orders Placed This Encounter   Procedures     Lipid Profile     ALT     Echocardiogram Complete       Orders Placed This Encounter   Medications     XELJANZ XR 11 MG 24 hr tablet     Si mg daily.     aspirin 81 MG EC tablet     Sig: Take 1 tablet (81 mg) by mouth daily.     Dispense:  90 tablet     Refill:  3     evolocumab (REPATHA) 140 MG/ML prefilled autoinjector     Sig: Inject 1 mL (140 mg) subcutaneously every 14 days.      Dispense:  6 mL     Refill:  3       Medications Discontinued During This Encounter   Medication Reason     rosuvastatin (CRESTOR) 20 MG tablet Stopped by Patient (No AVS)         Encounter Diagnoses   Name Primary?     Abnormal cardiovascular stress test Yes     Abnormal nuclear cardiac imaging test       (dyspnea on exertion)        CURRENT MEDICATIONS:  Current Outpatient Medications   Medication Sig Dispense Refill     aspirin 81 MG EC tablet Take 1 tablet (81 mg) by mouth daily. 90 tablet 3     cholecalciferol (VITAMIN D3) 1250 mcg (57419 units) capsule TAKE 1 CAPSULE (50,000 UNITS) BY MOUTH EVERY 7 DAYS 12 capsule 1     diclofenac (VOLTAREN) 1 % topical gel Apply 4 grams to the knees and 2 grams on the wrists/hands 3-4 times a day as needed 3600 g 1     evolocumab (REPATHA) 140 MG/ML prefilled autoinjector Inject 1 mL (140 mg) subcutaneously every 14 days. 6 mL 3     fluticasone (FLONASE) 50 MCG/ACT nasal spray Spray 1 spray into both nostrils daily 16 g 11     scopolamine (TRANSDERM) 1 MG/3DAYS 72 hr patch Place 1 patch onto the skin every 72 hours 10 patch 1     XELJANZ XR 11 MG 24 hr tablet 11 mg daily.       amoxicillin-clavulanate (AUGMENTIN) 875-125 MG tablet Take 1 tablet by mouth 2 times daily (Patient not taking: Reported on 11/5/2024) 20 tablet 1     cefdinir (OMNICEF) 300 MG capsule Take 1 capsule (300 mg) by mouth 2 times daily (Patient not taking: Reported on 11/5/2024) 14 capsule 1     cefuroxime (CEFTIN) 500 MG tablet Take 1 tablet (500 mg) by mouth 2 times daily (Patient not taking: Reported on 11/5/2024) 14 tablet 1     folic acid (FOLVITE) 1 MG tablet Take 1 mg by mouth daily (Patient not taking: Reported on 11/5/2024)       ipratropium (ATROVENT) 0.06 % nasal spray Spray 2 sprays into both nostrils 4 times daily (Patient not taking: Reported on 11/5/2024) 15 mL 4     ondansetron (ZOFRAN ODT) 4 MG ODT tab Take 1 tablet (4 mg) by mouth every 8 hours as needed for nausea (Patient not taking:  Reported on 2024) 30 tablet 1     ondansetron (ZOFRAN ODT) 8 MG ODT tab Take 1 tablet (8 mg) by mouth every 8 hours as needed for nausea (Patient not taking: Reported on 2024) 30 tablet 0     predniSONE (DELTASONE) 5 MG tablet Take 1 tablet (5 mg) by mouth daily (Patient not taking: Reported on 2024) 30 tablet 1     sulfamethoxazole-trimethoprim (BACTRIM DS) 800-160 MG tablet Take 1 tablet by mouth 2 times daily (Patient not taking: Reported on 2024) 14 tablet 1       ALLERGIES   No Known Allergies    PAST MEDICAL HISTORY:  Past Medical History:   Diagnosis Date     Hypogonadotropic hypogonadism (H)        PAST SURGICAL HISTORY:  Past Surgical History:   Procedure Laterality Date      SECTION         FAMILY HISTORY:  Family History   Problem Relation Age of Onset     Thyroid Disease Mother      Hyperlipidemia Mother      Hypertension Mother      Hypertension Father      Diabetes Maternal Grandmother      Cerebral aneurysm Maternal Grandfather         or tumor     Hyperlipidemia Sister        SOCIAL HISTORY:  Social History     Socioeconomic History     Marital status:      Spouse name: None     Number of children: None     Years of education: None     Highest education level: None   Tobacco Use     Smoking status: Never     Passive exposure: Never     Smokeless tobacco: Never   Vaping Use     Vaping status: Never Used   Substance and Sexual Activity     Alcohol use: Not Currently     Drug use: Never     Sexual activity: Yes     Partners: Male       Review of Systems:  Skin:          Eyes:         ENT:         Respiratory:  Positive for dyspnea on exertion     Cardiovascular:  Negative;syncope or near-syncope;cyanosis;lightheadedness;dizziness chest pain;Positive for;edema energy level decreasing  Gastroenterology:        Genitourinary:         Musculoskeletal:  Positive for joint pain    Neurologic:         Psychiatric:         Heme/Lymph/Imm:         Endocrine:           Physical  "Exam:  Vitals: BP 97/76   Pulse 77   Ht 1.715 m (5' 7.5\")   Wt 75.2 kg (165 lb 12.8 oz)   SpO2 97%   BMI 25.58 kg/m      General Patient appears comfortable  Neck normal JVP, no bruit  Cardiovascular system S1-S2 normal no murmur rub or gallop  Respiratory system clear to auscultation  Extremities no edema      CC  Latasha Bowie MD  [address]  [place] [phone]                      Thank you for allowing me to participate in the care of your patient.      Sincerely,     Surendra Chao MD     [facility]  cc:   Latasha Bowie MD  [address]  [place] [phone]      "

## 2024-11-08 ENCOUNTER — HOSPITAL ENCOUNTER (OUTPATIENT)
Dept: CARDIOLOGY | Facility: CLINIC | Age: 49
Discharge: HOME | End: 2024-11-08
Attending: INTERNAL MEDICINE | Admitting: INTERNAL MEDICINE
Payer: COMMERCIAL

## 2024-11-08 ENCOUNTER — TELEPHONE (OUTPATIENT)
Dept: CARDIOLOGY | Facility: CLINIC | Age: 49
End: 2024-11-08

## 2024-11-08 VITALS — SYSTOLIC BLOOD PRESSURE: 117 MMHG | DIASTOLIC BLOOD PRESSURE: 74 MMHG | HEART RATE: 65 BPM

## 2024-11-08 DIAGNOSIS — R94.39 ABNORMAL CARDIOVASCULAR STRESS TEST: ICD-10-CM

## 2024-11-08 PROCEDURE — 75574 CT ANGIO HRT W/3D IMAGE: CPT | Mod: 26 | Performed by: INTERNAL MEDICINE

## 2024-11-08 PROCEDURE — 250N000011 HC RX IP 250 OP 636: Performed by: INTERNAL MEDICINE

## 2024-11-08 PROCEDURE — 250N000013 HC RX MED GY IP 250 OP 250 PS 637: Performed by: INTERNAL MEDICINE

## 2024-11-08 PROCEDURE — 75574 CT ANGIO HRT W/3D IMAGE: CPT

## 2024-11-08 RX ORDER — METOPROLOL TARTRATE 1 MG/ML
5-20 INJECTION, SOLUTION INTRAVENOUS
Status: DISCONTINUED | OUTPATIENT
Start: 2024-11-08 | End: 2024-11-09 | Stop reason: HOSPADM

## 2024-11-08 RX ORDER — DILTIAZEM HYDROCHLORIDE 5 MG/ML
10-15 INJECTION INTRAVENOUS
Status: DISCONTINUED | OUTPATIENT
Start: 2024-11-08 | End: 2024-11-09 | Stop reason: HOSPADM

## 2024-11-08 RX ORDER — DIPHENHYDRAMINE HCL 25 MG
25 CAPSULE ORAL
Status: DISCONTINUED | OUTPATIENT
Start: 2024-11-08 | End: 2024-11-09 | Stop reason: HOSPADM

## 2024-11-08 RX ORDER — DIPHENHYDRAMINE HYDROCHLORIDE 50 MG/ML
25-50 INJECTION INTRAMUSCULAR; INTRAVENOUS
Status: DISCONTINUED | OUTPATIENT
Start: 2024-11-08 | End: 2024-11-09 | Stop reason: HOSPADM

## 2024-11-08 RX ORDER — LIDOCAINE 40 MG/G
CREAM TOPICAL
Status: DISCONTINUED | OUTPATIENT
Start: 2024-11-08 | End: 2024-11-09 | Stop reason: HOSPADM

## 2024-11-08 RX ORDER — DILTIAZEM HCL 60 MG
120 TABLET ORAL
Status: DISCONTINUED | OUTPATIENT
Start: 2024-11-08 | End: 2024-11-09 | Stop reason: HOSPADM

## 2024-11-08 RX ORDER — METHYLPREDNISOLONE SODIUM SUCCINATE 125 MG/2ML
125 INJECTION INTRAMUSCULAR; INTRAVENOUS
Status: DISCONTINUED | OUTPATIENT
Start: 2024-11-08 | End: 2024-11-09 | Stop reason: HOSPADM

## 2024-11-08 RX ORDER — NITROGLYCERIN 0.4 MG/1
0.4 TABLET SUBLINGUAL
Status: DISCONTINUED | OUTPATIENT
Start: 2024-11-08 | End: 2024-11-09 | Stop reason: HOSPADM

## 2024-11-08 RX ORDER — ONDANSETRON 2 MG/ML
4 INJECTION INTRAMUSCULAR; INTRAVENOUS
Status: DISCONTINUED | OUTPATIENT
Start: 2024-11-08 | End: 2024-11-09 | Stop reason: HOSPADM

## 2024-11-08 RX ORDER — IVABRADINE 5 MG/1
5-15 TABLET, FILM COATED ORAL
Status: DISCONTINUED | OUTPATIENT
Start: 2024-11-08 | End: 2024-11-09 | Stop reason: HOSPADM

## 2024-11-08 RX ORDER — IOPAMIDOL 755 MG/ML
500 INJECTION, SOLUTION INTRAVASCULAR ONCE
Status: COMPLETED | OUTPATIENT
Start: 2024-11-08 | End: 2024-11-08

## 2024-11-08 RX ORDER — METOPROLOL TARTRATE 25 MG/1
25-100 TABLET, FILM COATED ORAL
Status: COMPLETED | OUTPATIENT
Start: 2024-11-08 | End: 2024-11-08

## 2024-11-08 RX ADMIN — IOPAMIDOL 110 ML: 755 INJECTION, SOLUTION INTRAVENOUS at 13:59

## 2024-11-08 RX ADMIN — NITROGLYCERIN 0.4 MG: 0.4 TABLET SUBLINGUAL at 13:48

## 2024-11-08 RX ADMIN — METOPROLOL TARTRATE 50 MG: 50 TABLET, FILM COATED ORAL at 12:27

## 2024-11-08 NOTE — TELEPHONE ENCOUNTER
Team received results report for today's CT Angiogram.    Total Agatston score of zero.    Radiology report for incidental findings:  none.    Routing to Dr. Chao for review.    Tatiana Madrid RN on 11/8/2024 at 3:42 PM

## 2024-12-03 DIAGNOSIS — R09.82 POST-NASAL DRIP: Primary | ICD-10-CM

## 2024-12-03 RX ORDER — FLUTICASONE PROPIONATE 50 MCG
1 SPRAY, SUSPENSION (ML) NASAL DAILY
Qty: 16 G | Refills: 4 | Status: SHIPPED | OUTPATIENT
Start: 2024-12-03

## 2024-12-05 ENCOUNTER — HOSPITAL ENCOUNTER (OUTPATIENT)
Dept: RESPIRATORY THERAPY | Facility: CLINIC | Age: 49
Discharge: HOME | End: 2024-12-05
Attending: INTERNAL MEDICINE
Payer: COMMERCIAL

## 2024-12-05 ENCOUNTER — APPOINTMENT (OUTPATIENT)
Dept: LAB | Facility: CLINIC | Age: 49
End: 2024-12-05
Attending: INTERNAL MEDICINE
Payer: COMMERCIAL

## 2024-12-05 DIAGNOSIS — R06.09 DOE (DYSPNEA ON EXERTION): ICD-10-CM

## 2024-12-05 DIAGNOSIS — M06.09 RHEUMATOID ARTHRITIS OF MULTIPLE SITES WITHOUT RHEUMATOID FACTOR (H): ICD-10-CM

## 2024-12-05 LAB
DLCOCOR-%PRED-PRE: 93 %
DLCOCOR-PRE: 20.04 ML/MIN/MMHG
DLCOUNC-%PRED-PRE: 92 %
DLCOUNC-PRE: 19.98 ML/MIN/MMHG
DLCOUNC-PRED: 21.54 ML/MIN/MMHG
ERV-%PRED-PRE: 39 %
ERV-PRE: 0.49 L
ERV-PRED: 1.24 L
EXPTIME-PRE: 5.6 SEC
FEF2575-%PRED-PRE: 120 %
FEF2575-PRE: 3.18 L/SEC
FEF2575-PRED: 2.64 L/SEC
FEFMAX-%PRED-PRE: 109 %
FEFMAX-PRE: 7.65 L/SEC
FEFMAX-PRED: 7 L/SEC
FEV1-%PRED-PRE: 111 %
FEV1-PRE: 3.02 L
FEV1FEV6-PRE: 83 %
FEV1FEV6-PRED: 82 %
FEV1FVC-PRE: 82 %
FEV1FVC-PRED: 81 %
FEV1SVC-PRE: 83 %
FEV1SVC-PRED: 76 %
FIFMAX-PRE: 5.16 L/SEC
FRCPLETH-%PRED-PRE: 96 %
FRCPLETH-PRE: 2.7 L
FRCPLETH-PRED: 2.81 L
FVC-%PRED-PRE: 108 %
FVC-PRE: 3.66 L
FVC-PRED: 3.37 L
GAW-%PRED-PRE: 75 %
GAW-PRE: 0.78 L/S/CMH2O
GAW-PRED: 1.03 L/S/CMH2O
HGB BLD-MCNC: 13.3 G/DL (ref 11.7–15.7)
IC-%PRED-PRE: 127 %
IC-PRE: 3.16 L
IC-PRED: 2.48 L
RVPLETH-%PRED-PRE: 118 %
RVPLETH-PRE: 2.2 L
RVPLETH-PRED: 1.86 L
SGAW-%PRED-PRE: 279 %
SGAW-PRE: 0.29 1/CMH2O*S
SGAW-PRED: 0.1 1/CMH2O*S
SRAW-%PRED-PRE: 78 %
SRAW-PRE: 3.76 CMH2O*S
SRAW-PRED: 4.76 CMH2O*S
TLCPLETH-%PRED-PRE: 110 %
TLCPLETH-PRE: 5.85 L
TLCPLETH-PRED: 5.27 L
VA-%PRED-PRE: 101 %
VA-PRE: 5.19 L
VC-%PRED-PRE: 102 %
VC-PRE: 3.65 L
VC-PRED: 3.58 L

## 2024-12-05 PROCEDURE — 36415 COLL VENOUS BLD VENIPUNCTURE: CPT | Performed by: INTERNAL MEDICINE

## 2024-12-05 PROCEDURE — 94729 DIFFUSING CAPACITY: CPT

## 2024-12-05 PROCEDURE — 94010 BREATHING CAPACITY TEST: CPT

## 2024-12-05 PROCEDURE — 85018 HEMOGLOBIN: CPT | Performed by: INTERNAL MEDICINE

## 2024-12-05 PROCEDURE — 94726 PLETHYSMOGRAPHY LUNG VOLUMES: CPT

## 2024-12-05 NOTE — PROGRESS NOTES
Patient completed pulmonary function testing with spirometry, lung volumes and diffusion.  Good patient effort and cooperation. The results of this test met the ATS standards for acceptability and repeatability. Sissy Moore RT on 12/5/2024 at 7:50 AM

## 2024-12-18 ENCOUNTER — LAB (OUTPATIENT)
Dept: LAB | Facility: CLINIC | Age: 49
End: 2024-12-18
Payer: COMMERCIAL

## 2024-12-18 ENCOUNTER — TELEPHONE (OUTPATIENT)
Dept: CARDIOLOGY | Facility: CLINIC | Age: 49
End: 2024-12-18

## 2024-12-18 DIAGNOSIS — R94.39 ABNORMAL CARDIOVASCULAR STRESS TEST: ICD-10-CM

## 2024-12-18 LAB
ALT SERPL W P-5'-P-CCNC: 15 U/L (ref 0–50)
CHOLEST SERPL-MCNC: 126 MG/DL
FASTING STATUS PATIENT QL REPORTED: YES
HDLC SERPL-MCNC: 52 MG/DL
LDLC SERPL CALC-MCNC: 48 MG/DL
NONHDLC SERPL-MCNC: 74 MG/DL
TRIGL SERPL-MCNC: 130 MG/DL

## 2024-12-18 NOTE — TELEPHONE ENCOUNTER
Team received results information from today's lab for lipid profile.          Routing to Dr. Chao for review.

## 2024-12-20 ENCOUNTER — ALLIED HEALTH/NURSE VISIT (OUTPATIENT)
Dept: INTERNAL MEDICINE | Facility: CLINIC | Age: 49
End: 2024-12-20
Payer: COMMERCIAL

## 2024-12-20 DIAGNOSIS — J06.9 UPPER RESPIRATORY TRACT INFECTION, UNSPECIFIED TYPE: ICD-10-CM

## 2024-12-20 DIAGNOSIS — Z20.828 EXPOSURE TO THE FLU: ICD-10-CM

## 2024-12-20 PROCEDURE — 99207 PR NO CHARGE NURSE ONLY: CPT

## 2024-12-20 PROCEDURE — 87635 SARS-COV-2 COVID-19 AMP PRB: CPT

## 2024-12-21 LAB — SARS-COV-2 RNA RESP QL NAA+PROBE: NEGATIVE

## 2024-12-29 ENCOUNTER — HEALTH MAINTENANCE LETTER (OUTPATIENT)
Age: 49
End: 2024-12-29

## 2024-12-30 ENCOUNTER — TELEPHONE (OUTPATIENT)
Dept: INTERNAL MEDICINE | Facility: CLINIC | Age: 49
End: 2024-12-30

## 2024-12-30 ENCOUNTER — TELEPHONE (OUTPATIENT)
Dept: CARDIOLOGY | Facility: CLINIC | Age: 49
End: 2024-12-30

## 2024-12-30 ENCOUNTER — MYC MEDICAL ADVICE (OUTPATIENT)
Dept: INTERNAL MEDICINE | Facility: CLINIC | Age: 49
End: 2024-12-30

## 2024-12-30 ENCOUNTER — HOSPITAL ENCOUNTER (OUTPATIENT)
Dept: CARDIOLOGY | Facility: CLINIC | Age: 49
Discharge: HOME | End: 2024-12-30
Attending: INTERNAL MEDICINE | Admitting: INTERNAL MEDICINE
Payer: COMMERCIAL

## 2024-12-30 ENCOUNTER — VIRTUAL VISIT (OUTPATIENT)
Dept: INTERNAL MEDICINE | Facility: CLINIC | Age: 49
End: 2024-12-30
Payer: COMMERCIAL

## 2024-12-30 DIAGNOSIS — M19.90 JOINT INFLAMMATION: ICD-10-CM

## 2024-12-30 DIAGNOSIS — R94.39 ABNORMAL CARDIOVASCULAR STRESS TEST: ICD-10-CM

## 2024-12-30 DIAGNOSIS — R06.09 DOE (DYSPNEA ON EXERTION): ICD-10-CM

## 2024-12-30 DIAGNOSIS — R94.39 ABNORMAL CARDIOVASCULAR STRESS TEST: Primary | ICD-10-CM

## 2024-12-30 DIAGNOSIS — R93.1 ABNORMAL NUCLEAR CARDIAC IMAGING TEST: ICD-10-CM

## 2024-12-30 DIAGNOSIS — Z02.9 ADMINISTRATIVE ENCOUNTER: ICD-10-CM

## 2024-12-30 DIAGNOSIS — M06.4 POLYARTHRITIS, INFLAMMATORY (H): ICD-10-CM

## 2024-12-30 DIAGNOSIS — M25.50 MULTIPLE JOINT PAIN: Primary | ICD-10-CM

## 2024-12-30 DIAGNOSIS — M06.4 INFLAMMATORY POLYARTHROPATHY (H): ICD-10-CM

## 2024-12-30 LAB — LVEF ECHO: NORMAL

## 2024-12-30 PROCEDURE — G2211 COMPLEX E/M VISIT ADD ON: HCPCS | Mod: 95 | Performed by: INTERNAL MEDICINE

## 2024-12-30 PROCEDURE — 99214 OFFICE O/P EST MOD 30 MIN: CPT | Mod: 95 | Performed by: INTERNAL MEDICINE

## 2024-12-30 PROCEDURE — 255N000002 HC RX 255 OP 636: Performed by: INTERNAL MEDICINE

## 2024-12-30 PROCEDURE — 93306 TTE W/DOPPLER COMPLETE: CPT | Mod: 26 | Performed by: INTERNAL MEDICINE

## 2024-12-30 PROCEDURE — 999N000208 ECHOCARDIOGRAM COMPLETE

## 2024-12-30 RX ADMIN — HUMAN ALBUMIN MICROSPHERES AND PERFLUTREN 3 ML: 10; .22 INJECTION, SOLUTION INTRAVENOUS at 09:16

## 2024-12-30 NOTE — TELEPHONE ENCOUNTER
Surendra Chao MD  You11 minutes ago (1:05 PM)     Thank you.  Okay with annual follow-up, sooner if any change in clinical status or if patient prefers.    Surendra       Message sent to patient  Naty Nguyen RN on 12/30/2024 at 1:17 PM

## 2024-12-30 NOTE — PROGRESS NOTES
Trinity is a 49 year old who is being evaluated via a billable video visit.    How would you like to obtain your AVS? MyChart  If the video visit is dropped, the invitation should be resent by: Text to cell phone: [phone]  Will anyone else be joining your video visit? No        This is a VIDEO ( using Doximity)  encounter with the patient.       Location of the provider : office   Location of the patient : home      12:16 --- 13:03          Dr Jama's note      Patient's instructions / PLAN:                                                        Plan:  Continue same meds, same doses for now   2. FMLA forms  3. I advised her to talk to HR about short term disability          ASSESSMENT & PLAN:                                                      (M25.50) Multiple joint pain  (primary encounter diagnosis)  (M06.4) Inflammatory polyarthropathy (H)  (M06.4) Polyarthritis, inflammatory (H)  (M19.90) Joint inflammation  Comment: Flare up   Plan: diclofenac (VOLTAREN) 1 % topical gel     (Z02.9) Administrative encounter  Comment:   Plan: FMLA    Chief complaint:                                                      RA    SUBJECTIVE:                                                    History of present illness:    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    She has tried various medications, but they were either ineffective or caused side effects. She regularly visits her rheumatologist. Approximately 4-6 months ago, she began taking Xeljanz. While the rheumatoid arthritis (RA) has not significantly improved, Xeljanz helps manage the condition. She has noticed that discontinuing Xeljanz leads to an RA flare-up.    On December 20, 2024, she was diagnosed with influenza, which is prevalent in her family. Her rheumatologist recommended pausing Xeljanz for a week. During this period, her RA symptoms worsened, resulting in painful inflammation in multiple joints, including her wrists, knees, ankles, and toes,  "particularly on the right side. She is now unable to navigate stairs and had to spend the night in the living room. She cannot perform kitchen chores as she cannot stand for more than 10 minutes and struggles to hold a cup of coffee.    She resumed taking Xeljanz two days ago and feels slightly better, but she continues to experience significant joint inflammation and pain that affects her activities of daily living (ADLs).    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 She is currently unable to perform these tasks.    Since her RA diagnosis in 2022, she has experienced frequent \"mini flare-ups,\" lasting from a few hours to two days. These flare-ups can occur during work, causing stress due to her inability to walk or type, as well as on her days off when she is not engaging in physical activity.    We discussed the possibility of applying for Family and Medical Leave Act (FMLA) coverage for the \"mini flares\" and short-term disability for more prolonged flare-ups. She will discuss these options with Human Resources.        Jonh Petersen is a 49 year old, presenting for the following health issues:  RECHECK      12/30/2024    11:02 AM   Additional Questions   Roomed by Corie CHENG         Review of Systems:                                                      ROS: negative for fever, chills, cough, wheezes, chest pain, shortness of breath, vomiting, abdominal pain, leg swelling       OBJECTIVE:           An actual physical exam can't be done during phone visit   A limited exam can sometimes be performed by video visit   NAD  R wrist , R knee swollen compare with the L ones. Limited images do to video, but I can see the difference   R 2nd and 3rd PIP swollen as well       PMHx: reviewed  Past Medical History:   Diagnosis Date    Hypogonadotropic " hypogonadism (H)       PSHx: reviewed  Past Surgical History:   Procedure Laterality Date     SECTION          Meds: reviewed  Current Outpatient Medications   Medication Sig Dispense Refill    cholecalciferol (VITAMIN D3) 1250 mcg (26487 units) capsule TAKE 1 CAPSULE (50,000 UNITS) BY MOUTH EVERY 7 DAYS 12 capsule 1    diclofenac (VOLTAREN) 1 % topical gel Apply 4 grams to the knees and 2 grams on the wrists/hands 3-4 times a day as needed 3600 g 1    evolocumab (REPATHA) 140 MG/ML prefilled autoinjector Inject 1 mL (140 mg) subcutaneously every 14 days. 6 mL 3    fluticasone (FLONASE) 50 MCG/ACT nasal spray Spray 1 spray into both nostrils daily 16 g 11    ipratropium (ATROVENT) 0.06 % nasal spray Spray 2 sprays into both nostrils 4 times daily 15 mL 4    ondansetron (ZOFRAN ODT) 4 MG ODT tab Take 1 tablet (4 mg) by mouth every 8 hours as needed for nausea 30 tablet 1    XELJANZ XR 11 MG 24 hr tablet 11 mg daily.      amoxicillin-clavulanate (AUGMENTIN) 875-125 MG tablet Take 1 tablet by mouth 2 times daily 20 tablet 1    aspirin 81 MG EC tablet Take 1 tablet (81 mg) by mouth daily. (Patient not taking: Reported on 2024) 90 tablet 3    cefdinir (OMNICEF) 300 MG capsule Take 1 capsule (300 mg) by mouth 2 times daily 14 capsule 1    cefuroxime (CEFTIN) 500 MG tablet Take 1 tablet (500 mg) by mouth 2 times daily 14 tablet 1    fluticasone (FLONASE) 50 MCG/ACT nasal spray Spray 1 spray into both nostrils daily. 16 g 4    folic acid (FOLVITE) 1 MG tablet Take 1 mg by mouth daily.      ondansetron (ZOFRAN ODT) 8 MG ODT tab Take 1 tablet (8 mg) by mouth every 8 hours as needed for nausea 30 tablet 0    oseltamivir (TAMIFLU) 75 MG capsule Take 1 capsule (75 mg) by mouth daily. 10 capsule 0    predniSONE (DELTASONE) 5 MG tablet Take 1 tablet (5 mg) by mouth daily 30 tablet 1    scopolamine (TRANSDERM) 1 MG/3DAYS 72 hr patch Place 1 patch onto the skin every 72 hours 10 patch 1    sulfamethoxazole-trimethoprim  (BACTRIM DS) 800-160 MG tablet Take 1 tablet by mouth 2 times daily 14 tablet 1       Soc Hx: reviewed  Fam Hx: reviewed        Chart documentation was completed, in part, with [employer] voice-recognition software. Even though reviewed, some grammatical, spelling, and word errors may remain.    Latasha Jama MD  Internal Medicine       Signed Electronically by: Latasha Bowie MD

## 2024-12-30 NOTE — TELEPHONE ENCOUNTER
Follow-up appointment was TBD based on results    -Echo completed:   The left ventricle is normal in size.  There is normal left ventricular wall thickness.  The visual ejection fraction is 55-60%.  Left ventricular diastolic function is normal.  The right ventricle is normal in structure, function and size.  No significant valve issues identified by doppler interrogation. The valves are not well seen. The study was technically difficult. Contrast was used without apparent complications.    -CT Angiogram: total Agatston score of Zero    -Lipid panel improved with start of Repatha: LDL 48    Okay for annual office visits or would you like patient seen sooner?     Thank you,   Naty HANKINS RN

## 2024-12-30 NOTE — NURSING NOTE
"Chief Complaint   Patient presents with    RECHECK     initial LMP  (LMP Unknown)  Estimated body mass index is 25.58 kg/m  as calculated from the following:    Height as of 11/5/24: 1.715 m (5' 7.5\").    Weight as of 11/5/24: 75.2 kg (165 lb 12.8 oz)..  bp completed using cuff size NA (Not Taken)  ARMANI HENLEY LPN  "

## 2025-01-01 ENCOUNTER — APPOINTMENT (OUTPATIENT)
Dept: GENERAL RADIOLOGY | Facility: CLINIC | Age: 50
End: 2025-01-01
Attending: EMERGENCY MEDICINE
Payer: COMMERCIAL

## 2025-01-01 ENCOUNTER — HOSPITAL ENCOUNTER (EMERGENCY)
Facility: CLINIC | Age: 50
Discharge: HOME | End: 2025-01-01
Attending: EMERGENCY MEDICINE
Payer: COMMERCIAL

## 2025-01-01 VITALS
WEIGHT: 165 LBS | SYSTOLIC BLOOD PRESSURE: 124 MMHG | HEIGHT: 66 IN | OXYGEN SATURATION: 98 % | TEMPERATURE: 98.5 F | RESPIRATION RATE: 18 BRPM | HEART RATE: 94 BPM | DIASTOLIC BLOOD PRESSURE: 82 MMHG | BODY MASS INDEX: 26.52 KG/M2

## 2025-01-01 DIAGNOSIS — J10.1 INFLUENZA A: ICD-10-CM

## 2025-01-01 DIAGNOSIS — R50.9 FEVER, UNSPECIFIED FEVER CAUSE: ICD-10-CM

## 2025-01-01 LAB
ANION GAP SERPL CALCULATED.3IONS-SCNC: 12 MMOL/L (ref 7–15)
BASOPHILS # BLD AUTO: 0.1 10E3/UL (ref 0–0.2)
BASOPHILS NFR BLD AUTO: 1 %
BUN SERPL-MCNC: 12.5 MG/DL (ref 6–20)
CALCIUM SERPL-MCNC: 9.1 MG/DL (ref 8.8–10.4)
CHLORIDE SERPL-SCNC: 105 MMOL/L (ref 98–107)
CREAT SERPL-MCNC: 0.93 MG/DL (ref 0.51–0.95)
EGFRCR SERPLBLD CKD-EPI 2021: 74 ML/MIN/1.73M2
EOSINOPHIL # BLD AUTO: 0 10E3/UL (ref 0–0.7)
EOSINOPHIL NFR BLD AUTO: 1 %
ERYTHROCYTE [DISTWIDTH] IN BLOOD BY AUTOMATED COUNT: 13.4 % (ref 10–15)
FLUAV RNA SPEC QL NAA+PROBE: POSITIVE
FLUBV RNA RESP QL NAA+PROBE: NEGATIVE
GLUCOSE SERPL-MCNC: 141 MG/DL (ref 70–99)
HCO3 SERPL-SCNC: 23 MMOL/L (ref 22–29)
HCT VFR BLD AUTO: 39.4 % (ref 35–47)
HGB BLD-MCNC: 13.4 G/DL (ref 11.7–15.7)
IMM GRANULOCYTES # BLD: 0.1 10E3/UL
IMM GRANULOCYTES NFR BLD: 1 %
LYMPHOCYTES # BLD AUTO: 0.8 10E3/UL (ref 0.8–5.3)
LYMPHOCYTES NFR BLD AUTO: 14 %
MCH RBC QN AUTO: 28.2 PG (ref 26.5–33)
MCHC RBC AUTO-ENTMCNC: 34 G/DL (ref 31.5–36.5)
MCV RBC AUTO: 83 FL (ref 78–100)
MONOCYTES # BLD AUTO: 0.5 10E3/UL (ref 0–1.3)
MONOCYTES NFR BLD AUTO: 9 %
NEUTROPHILS # BLD AUTO: 4.1 10E3/UL (ref 1.6–8.3)
NEUTROPHILS NFR BLD AUTO: 75 %
NRBC # BLD AUTO: 0 10E3/UL
NRBC BLD AUTO-RTO: 0 /100
PLATELET # BLD AUTO: 260 10E3/UL (ref 150–450)
POTASSIUM SERPL-SCNC: 3.9 MMOL/L (ref 3.4–5.3)
RBC # BLD AUTO: 4.75 10E6/UL (ref 3.8–5.2)
RSV RNA SPEC NAA+PROBE: NEGATIVE
SARS-COV-2 RNA RESP QL NAA+PROBE: NEGATIVE
SODIUM SERPL-SCNC: 140 MMOL/L (ref 135–145)
WBC # BLD AUTO: 5.5 10E3/UL (ref 4–11)

## 2025-01-01 PROCEDURE — 87637 SARSCOV2&INF A&B&RSV AMP PRB: CPT | Performed by: EMERGENCY MEDICINE

## 2025-01-01 PROCEDURE — 36415 COLL VENOUS BLD VENIPUNCTURE: CPT | Performed by: EMERGENCY MEDICINE

## 2025-01-01 PROCEDURE — 71046 X-RAY EXAM CHEST 2 VIEWS: CPT

## 2025-01-01 PROCEDURE — 80048 BASIC METABOLIC PNL TOTAL CA: CPT | Performed by: EMERGENCY MEDICINE

## 2025-01-01 PROCEDURE — 85004 AUTOMATED DIFF WBC COUNT: CPT | Performed by: EMERGENCY MEDICINE

## 2025-01-01 RX ORDER — DOXYCYCLINE 100 MG/1
100 CAPSULE ORAL 2 TIMES DAILY
Qty: 14 CAPSULE | Refills: 0 | Status: SHIPPED | OUTPATIENT
Start: 2025-01-02 | End: 2025-01-09

## 2025-01-01 ASSESSMENT — COLUMBIA-SUICIDE SEVERITY RATING SCALE - C-SSRS
6. HAVE YOU EVER DONE ANYTHING, STARTED TO DO ANYTHING, OR PREPARED TO DO ANYTHING TO END YOUR LIFE?: NO
1. IN THE PAST MONTH, HAVE YOU WISHED YOU WERE DEAD OR WISHED YOU COULD GO TO SLEEP AND NOT WAKE UP?: NO
2. HAVE YOU ACTUALLY HAD ANY THOUGHTS OF KILLING YOURSELF IN THE PAST MONTH?: NO

## 2025-01-01 ASSESSMENT — ACTIVITIES OF DAILY LIVING (ADL)
ADLS_ACUITY_SCORE: 41

## 2025-01-02 ENCOUNTER — TELEPHONE (OUTPATIENT)
Dept: INTERNAL MEDICINE | Facility: CLINIC | Age: 50
End: 2025-01-02
Payer: COMMERCIAL

## 2025-01-02 NOTE — ED TRIAGE NOTES
Pt c/o fevers, body aches, cough, symptoms started on 12/20. Worse after taking immunosuppressants for RA.    Triage Assessment (Adult)       Row Name 01/01/25 1950          Triage Assessment    Airway WDL WDL        Respiratory WDL    Respiratory WDL WDL        Cardiac WDL    Cardiac WDL WDL        Peripheral/Neurovascular WDL    Peripheral Neurovascular WDL WDL        Cognitive/Neuro/Behavioral WDL    Cognitive/Neuro/Behavioral WDL WDL

## 2025-01-02 NOTE — ED PROVIDER NOTES
Emergency Department Note      History of Present Illness     Chief Complaint   Flu Symptoms      HPI   Trinity Chow is a 49 year old female presents with concern for influenza-like illness.  Patient reports that symptoms began on 2024 with bodyaches, fever, and cough.  Patient completed course of Tamiflu and had held her immunosuppressive medications while sick.  She does take Xeljanz for RA as well as Repatha for hyperlipidemia.  She recently restarted these medications and noted increased productive cough, elevated heart rate, and fever today of 103.  Presents to the ED for evaluation.  Took antipyretic prior to ED arrival.  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    Independent Historian   None    Review of External Notes   N/a    Past Medical History     Medical History and Problem List   Past Medical History:   Diagnosis Date    Hypogonadotropic hypogonadism (H)        Medications   doxycycline hyclate (VIBRAMYCIN) 100 MG capsule  amoxicillin-clavulanate (AUGMENTIN) 875-125 MG tablet  aspirin 81 MG EC tablet  cefdinir (OMNICEF) 300 MG capsule  cefuroxime (CEFTIN) 500 MG tablet  cholecalciferol (VITAMIN D3) 1250 mcg (46242 units) capsule  diclofenac (VOLTAREN) 1 % topical gel  evolocumab (REPATHA) 140 MG/ML prefilled autoinjector  fluticasone (FLONASE) 50 MCG/ACT nasal spray  fluticasone (FLONASE) 50 MCG/ACT nasal spray  folic acid (FOLVITE) 1 MG tablet  ipratropium (ATROVENT) 0.06 % nasal spray  ondansetron (ZOFRAN ODT) 4 MG ODT tab  ondansetron (ZOFRAN ODT) 8 MG ODT tab  oseltamivir (TAMIFLU) 75 MG capsule  predniSONE (DELTASONE) 5 MG tablet  scopolamine (TRANSDERM) 1 MG/3DAYS 72 hr patch  sulfamethoxazole-trimethoprim (BACTRIM DS) 800-160 MG tablet  XELJANZ XR 11 MG 24 hr tablet        Surgical History   Past Surgical History:   Procedure Laterality Date     SECTION         Physical Exam     Patient Vitals for the past 24 hrs:   BP Temp Temp src Pulse Resp SpO2  "Height Weight   01/01/25 1948 124/82 98.5  F (36.9  C) Temporal 94 18 98 % 1.676 m (5' 6\") 74.8 kg (165 lb)     Physical Exam  General: Alert and cooperative with exam. Patient in mild distress. Normal mentation.  Nontoxic appearance  Head:  Scalp is NC/AT  Eyes:  No scleral icterus, PERRL  ENT:  The external nose and ears are normal. The oropharynx is with mild posterior oropharynx erythema; mucus membranes are moist. Uvula midline, no evidence of deep space infection.  Neck:  Normal range of motion without rigidity.  CV:  Regular rate and rhythm    No pathologic murmur   Resp:  Breath sounds are clear bilaterally    Non-labored, no retractions or accessory muscle use  GI:  Abdomen is soft, no distension, no tenderness. No peritoneal signs  MS:  No lower extremity edema   Skin:  Warm and dry, No rash or lesions noted.  Neuro: Oriented x 3. No gross motor deficits.      Diagnostics     Lab Results   Labs Ordered and Resulted from Time of ED Arrival to Time of ED Departure   INFLUENZA A/B, RSV AND SARS-COV2 PCR - Abnormal       Result Value    Influenza A PCR Positive (*)     Influenza B PCR Negative      RSV PCR Negative      SARS CoV2 PCR Negative     BASIC METABOLIC PANEL - Abnormal    Sodium 140      Potassium 3.9      Chloride 105      Carbon Dioxide (CO2) 23      Anion Gap 12      Urea Nitrogen 12.5      Creatinine 0.93      GFR Estimate 74      Calcium 9.1      Glucose 141 (*)    CBC WITH PLATELETS AND DIFFERENTIAL    WBC Count 5.5      RBC Count 4.75      Hemoglobin 13.4      Hematocrit 39.4      MCV 83      MCH 28.2      MCHC 34.0      RDW 13.4      Platelet Count 260      % Neutrophils 75      % Lymphocytes 14      % Monocytes 9      % Eosinophils 1      % Basophils 1      % Immature Granulocytes 1      NRBCs per 100 WBC 0      Absolute Neutrophils 4.1      Absolute Lymphocytes 0.8      Absolute Monocytes 0.5      Absolute Eosinophils 0.0      Absolute Basophils 0.1      Absolute Immature Granulocytes 0.1   "    Absolute NRBCs 0.0         Imaging   XR Chest 2 Views   Final Result   IMPRESSION: PA and lateral views of the chest were obtained. Cardiomediastinal silhouette is within normal limits. No suspicious focal pulmonary opacities. No significant pleural effusion or pneumothorax.               Independent Interpretation   CXR: No pneumothorax or infiltrate.    ED Course      Medications Administered   Medications - No data to display    Procedures   Procedures     Discussion of Management   None    ED Course        Additional Documentation  None    Medical Decision Making / Diagnosis     CMS Diagnoses: None    MIPS       None    MDM   Trinity Chow is a 49 year old female who presents for evaluation of cough, fever and myalgias. This is consistent with influenza.  No indication at this time for repeat treatment with Tamiflu.  Labs and chest x-ray reassuring as above.  Patient hemodynamically stable with normal work of breathing. While patient's presentation likely represents ongoing viral illness/influenza infection, given report of worsening productive cough and reemergence of fevers, she was provided wait-and-see prescription for doxycycline as developing atypical pneumonia is not excluded.  Continued supportive care and close follow-up with PCP recommended if not improving.  Return precautions discussed.    Disposition   The patient was discharged.     Diagnosis     ICD-10-CM    1. Influenza A  J10.1       2. Fever, unspecified fever cause  R50.9            Discharge Medications   Discharge Medication List as of 1/1/2025 10:38 PM        START taking these medications    Details   doxycycline hyclate (VIBRAMYCIN) 100 MG capsule Take 1 capsule (100 mg) by mouth 2 times daily for 7 days., Disp-14 capsule, R-0, Local Print                  Ravindra Chavez,   01/02/25 0255

## 2025-01-02 NOTE — TELEPHONE ENCOUNTER
Latasha Bowie MD  11:39 AM           Please send for Albuterol nebulizer PA, asap  -- patient has severe RA with a flare up involving fingers and hands and wrists.  -- she cannot hold a teaspoon because of the pain. She is unable to use an inhaler

## 2025-01-02 NOTE — TELEPHONE ENCOUNTER
Prior Authorization Not Needed per Insurance and Pharmacy    Medication: ALBUTEROL SULFATE (2.5 MG/3ML) 0.083% IN NEBU  Insurance Company: [employer] - Phone [phone] Fax [phone]  Expected CoPay: $    Pharmacy Filling the Rx: [facility]  [place] - [address] [facility]-  Pharmacy Notified: Yes, it is refill too soon.  Patient Notified: The pharmacy will notify the patient.

## 2025-01-06 ENCOUNTER — TRANSFERRED RECORDS (OUTPATIENT)
Dept: HEALTH INFORMATION MANAGEMENT | Facility: CLINIC | Age: 50
End: 2025-01-06
Payer: COMMERCIAL

## 2025-01-06 LAB
ALT SERPL-CCNC: 19 IU/L (ref 6–32)
AST SERPL-CCNC: 23 U/L (ref 10–35)
CREATININE (EXTERNAL): 0.8 MG/DL (ref 0.5–1.05)

## 2025-01-23 ENCOUNTER — MYC MEDICAL ADVICE (OUTPATIENT)
Dept: INTERNAL MEDICINE | Facility: CLINIC | Age: 50
End: 2025-01-23
Payer: COMMERCIAL

## 2025-01-27 NOTE — TELEPHONE ENCOUNTER
Per Dr Jama's request. Corrected the date, Dr Jama signed it and if was emailed to Dr Chow at [email or web address]  on 1/23/2025

## 2025-04-14 ENCOUNTER — PATIENT OUTREACH (OUTPATIENT)
Dept: CARE COORDINATION | Facility: CLINIC | Age: 50
End: 2025-04-14
Payer: COMMERCIAL

## 2025-07-02 ENCOUNTER — MYC MEDICAL ADVICE (OUTPATIENT)
Dept: INTERNAL MEDICINE | Facility: CLINIC | Age: 50
End: 2025-07-02
Payer: COMMERCIAL

## 2025-07-02 NOTE — TELEPHONE ENCOUNTER
Last office visit - 12/20/2024.    Sent "[employer] message to patient.    Thank you,  Jamison, Triage GILBERT Boyce    3:52 PM 7/2/2025

## 2025-07-07 ENCOUNTER — TELEPHONE (OUTPATIENT)
Dept: INTERNAL MEDICINE | Facility: CLINIC | Age: 50
End: 2025-07-07

## 2025-07-07 DIAGNOSIS — Z78.9 PATIENT TRAVELS: ICD-10-CM

## 2025-07-07 DIAGNOSIS — D84.9 IMMUNOSUPPRESSION: ICD-10-CM

## 2025-07-07 DIAGNOSIS — R09.82 POST-NASAL DRIP: ICD-10-CM

## 2025-07-07 DIAGNOSIS — D84.9 IMMUNOCOMPROMISED PATIENT: ICD-10-CM

## 2025-07-07 DIAGNOSIS — M06.4 INFLAMMATORY POLYARTHROPATHY (H): ICD-10-CM

## 2025-07-07 DIAGNOSIS — M06.09 RHEUMATOID ARTHRITIS OF MULTIPLE SITES WITHOUT RHEUMATOID FACTOR (H): Primary | ICD-10-CM

## 2025-07-07 DIAGNOSIS — M06.4 POLYARTHRITIS, INFLAMMATORY (H): ICD-10-CM

## 2025-07-07 RX ORDER — AZITHROMYCIN 250 MG/1
500 TABLET, FILM COATED ORAL DAILY
Qty: 6 TABLET | Refills: 0 | Status: SHIPPED | OUTPATIENT
Start: 2025-07-07 | End: 2025-07-10

## 2025-07-07 RX ORDER — SCOPOLAMINE 1 MG/3D
1 PATCH, EXTENDED RELEASE TRANSDERMAL
Qty: 14 PATCH | Refills: 1 | Status: SHIPPED | OUTPATIENT
Start: 2025-07-07

## 2025-07-07 RX ORDER — SULFAMETHOXAZOLE AND TRIMETHOPRIM 800; 160 MG/1; MG/1
1 TABLET ORAL 2 TIMES DAILY
Qty: 14 TABLET | Refills: 1 | Status: SHIPPED | OUTPATIENT
Start: 2025-07-07

## 2025-07-07 RX ORDER — PREDNISONE 5 MG/1
5 TABLET ORAL DAILY
Qty: 30 TABLET | Refills: 1 | Status: SHIPPED | OUTPATIENT
Start: 2025-07-07

## 2025-07-07 RX ORDER — ONDANSETRON 4 MG/1
4 TABLET, ORALLY DISINTEGRATING ORAL EVERY 8 HOURS PRN
Qty: 30 TABLET | Refills: 1 | Status: SHIPPED | OUTPATIENT
Start: 2025-07-07

## 2025-07-07 RX ORDER — FLUTICASONE PROPIONATE 50 MCG
1 SPRAY, SUSPENSION (ML) NASAL DAILY
Qty: 16 G | Refills: 4 | Status: SHIPPED | OUTPATIENT
Start: 2025-07-07

## 2025-07-07 RX ORDER — CEFDINIR 300 MG/1
300 CAPSULE ORAL 2 TIMES DAILY
Qty: 14 CAPSULE | Refills: 1 | Status: SHIPPED | OUTPATIENT
Start: 2025-07-07

## 2025-07-07 NOTE — TELEPHONE ENCOUNTER
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

## 2025-07-12 ENCOUNTER — HEALTH MAINTENANCE LETTER (OUTPATIENT)
Age: 50
End: 2025-07-12

## 2025-07-31 DIAGNOSIS — Z51.81 ENCOUNTER FOR THERAPEUTIC DRUG MONITORING: Primary | ICD-10-CM

## 2025-07-31 DIAGNOSIS — M06.00 RHEUMATOID ARTHRITIS WITHOUT RHEUMATOID FACTOR, UNSPECIFIED SITE (H): ICD-10-CM

## 2025-08-06 ENCOUNTER — MYC MEDICAL ADVICE (OUTPATIENT)
Dept: INTERNAL MEDICINE | Facility: CLINIC | Age: 50
End: 2025-08-06

## 2025-08-06 ENCOUNTER — LAB (OUTPATIENT)
Dept: LAB | Facility: CLINIC | Age: 50
End: 2025-08-06
Payer: COMMERCIAL

## 2025-08-06 DIAGNOSIS — M06.09 RHEUMATOID ARTHRITIS OF MULTIPLE SITES WITHOUT RHEUMATOID FACTOR (H): Primary | ICD-10-CM

## 2025-08-06 DIAGNOSIS — M06.00 RHEUMATOID ARTHRITIS WITHOUT RHEUMATOID FACTOR, UNSPECIFIED SITE (H): ICD-10-CM

## 2025-08-06 DIAGNOSIS — Z51.81 ENCOUNTER FOR THERAPEUTIC DRUG MONITORING: ICD-10-CM

## 2025-08-06 DIAGNOSIS — M06.09 RHEUMATOID ARTHRITIS OF MULTIPLE SITES WITHOUT RHEUMATOID FACTOR (H): ICD-10-CM

## 2025-08-06 LAB
BASOPHILS # BLD AUTO: 0 10E3/UL (ref 0–0.2)
BASOPHILS NFR BLD AUTO: 1 %
EOSINOPHIL # BLD AUTO: 0.1 10E3/UL (ref 0–0.7)
EOSINOPHIL NFR BLD AUTO: 2 %
ERYTHROCYTE [DISTWIDTH] IN BLOOD BY AUTOMATED COUNT: 13 % (ref 10–15)
ERYTHROCYTE [SEDIMENTATION RATE] IN BLOOD BY WESTERGREN METHOD: 8 MM/HR (ref 0–30)
HCT VFR BLD AUTO: 39.6 % (ref 35–47)
HGB BLD-MCNC: 13.4 G/DL (ref 11.7–15.7)
IMM GRANULOCYTES # BLD: 0 10E3/UL
IMM GRANULOCYTES NFR BLD: 0 %
LYMPHOCYTES # BLD AUTO: 2.6 10E3/UL (ref 0.8–5.3)
LYMPHOCYTES NFR BLD AUTO: 51 %
MCH RBC QN AUTO: 28.4 PG (ref 26.5–33)
MCHC RBC AUTO-ENTMCNC: 33.8 G/DL (ref 31.5–36.5)
MCV RBC AUTO: 84 FL (ref 78–100)
MONOCYTES # BLD AUTO: 0.3 10E3/UL (ref 0–1.3)
MONOCYTES NFR BLD AUTO: 6 %
NEUTROPHILS # BLD AUTO: 2 10E3/UL (ref 1.6–8.3)
NEUTROPHILS NFR BLD AUTO: 40 %
PLATELET # BLD AUTO: 286 10E3/UL (ref 150–450)
RBC # BLD AUTO: 4.72 10E6/UL (ref 3.8–5.2)
WBC # BLD AUTO: 5.1 10E3/UL (ref 4–11)

## 2025-08-06 PROCEDURE — 86140 C-REACTIVE PROTEIN: CPT

## 2025-08-06 PROCEDURE — 85652 RBC SED RATE AUTOMATED: CPT

## 2025-08-06 PROCEDURE — 85025 COMPLETE CBC W/AUTO DIFF WBC: CPT

## 2025-08-06 PROCEDURE — 80053 COMPREHEN METABOLIC PANEL: CPT

## 2025-08-07 LAB
ALBUMIN SERPL BCG-MCNC: 4.4 G/DL (ref 3.5–5.2)
ALP SERPL-CCNC: 71 U/L (ref 40–150)
ALT SERPL W P-5'-P-CCNC: 19 U/L (ref 0–50)
ANION GAP SERPL CALCULATED.3IONS-SCNC: 12 MMOL/L (ref 7–15)
AST SERPL W P-5'-P-CCNC: 25 U/L (ref 0–45)
BILIRUB SERPL-MCNC: 0.3 MG/DL
BUN SERPL-MCNC: 15.7 MG/DL (ref 6–20)
CALCIUM SERPL-MCNC: 9.7 MG/DL (ref 8.8–10.4)
CHLORIDE SERPL-SCNC: 106 MMOL/L (ref 98–107)
CREAT SERPL-MCNC: 0.78 MG/DL (ref 0.51–0.95)
CRP SERPL-MCNC: <3 MG/L
EGFRCR SERPLBLD CKD-EPI 2021: >90 ML/MIN/1.73M2
GLUCOSE SERPL-MCNC: 96 MG/DL (ref 70–99)
HCO3 SERPL-SCNC: 24 MMOL/L (ref 22–29)
POTASSIUM SERPL-SCNC: 4.3 MMOL/L (ref 3.4–5.3)
PROT SERPL-MCNC: 7.2 G/DL (ref 6.4–8.3)
SODIUM SERPL-SCNC: 142 MMOL/L (ref 135–145)

## 2025-08-29 ENCOUNTER — TELEPHONE (OUTPATIENT)
Dept: CARDIOLOGY | Facility: CLINIC | Age: 50
End: 2025-08-29
Payer: COMMERCIAL

## (undated) RX ORDER — LIDOCAINE HYDROCHLORIDE 5 MG/ML
INJECTION, SOLUTION INFILTRATION; INTRAVENOUS
Status: DISPENSED
Start: 2022-10-19

## (undated) RX ORDER — TRIAMCINOLONE ACETONIDE 40 MG/ML
INJECTION, SUSPENSION INTRA-ARTICULAR; INTRAMUSCULAR
Status: DISPENSED
Start: 2022-10-19

## (undated) RX ORDER — REGADENOSON 0.08 MG/ML
INJECTION, SOLUTION INTRAVENOUS
Status: DISPENSED
Start: 2024-10-31

## (undated) RX ORDER — METOPROLOL TARTRATE 50 MG
TABLET ORAL
Status: DISPENSED
Start: 2024-11-08